# Patient Record
Sex: FEMALE | Race: BLACK OR AFRICAN AMERICAN
[De-identification: names, ages, dates, MRNs, and addresses within clinical notes are randomized per-mention and may not be internally consistent; named-entity substitution may affect disease eponyms.]

---

## 2017-06-15 ENCOUNTER — HOSPITAL ENCOUNTER (EMERGENCY)
Dept: HOSPITAL 18 - NAV ERS | Age: 52
Discharge: HOME | End: 2017-06-15
Payer: COMMERCIAL

## 2017-06-15 DIAGNOSIS — J02.9: Primary | ICD-10-CM

## 2017-06-15 PROCEDURE — 99283 EMERGENCY DEPT VISIT LOW MDM: CPT

## 2018-01-17 ENCOUNTER — HOSPITAL ENCOUNTER (EMERGENCY)
Dept: HOSPITAL 18 - NAV ERS | Age: 53
Discharge: HOME | End: 2018-01-17
Payer: SELF-PAY

## 2018-01-17 DIAGNOSIS — J06.9: Primary | ICD-10-CM

## 2018-01-17 PROCEDURE — 99283 EMERGENCY DEPT VISIT LOW MDM: CPT

## 2018-01-19 ENCOUNTER — HOSPITAL ENCOUNTER (EMERGENCY)
Dept: HOSPITAL 18 - NAV ERS | Age: 53
Discharge: HOME | End: 2018-01-19
Payer: SELF-PAY

## 2018-01-19 DIAGNOSIS — J18.9: Primary | ICD-10-CM

## 2018-01-19 LAB
ALBUMIN SERPL BCG-MCNC: 3.8 G/DL (ref 3.5–5)
ALP SERPL-CCNC: 105 U/L (ref 40–150)
ALT SERPL W P-5'-P-CCNC: 25 U/L (ref 8–55)
ANION GAP SERPL CALC-SCNC: 14 MMOL/L (ref 10–20)
AST SERPL-CCNC: 24 U/L (ref 5–34)
BILIRUB SERPL-MCNC: 0.2 MG/DL (ref 0.2–1.2)
BUN SERPL-MCNC: 15 MG/DL (ref 9.8–20.1)
CALCIUM SERPL-MCNC: 9.3 MG/DL (ref 7.8–10.44)
CHLORIDE SERPL-SCNC: 108 MMOL/L (ref 98–107)
CO2 SERPL-SCNC: 25 MMOL/L (ref 22–29)
CREAT CL PREDICTED SERPL C-G-VRATE: 0 ML/MIN (ref 70–130)
GLOBULIN SER CALC-MCNC: 3.9 G/DL (ref 2.4–3.5)
GLUCOSE SERPL-MCNC: 232 MG/DL (ref 70–105)
HGB BLD-MCNC: 11.4 G/DL (ref 12–16)
MCH RBC QN AUTO: 27.1 PG (ref 27–31)
MCV RBC AUTO: 87.1 FL (ref 81–99)
MDIFF COMPLETE?: YES
PLATELET # BLD AUTO: 218 THOU/UL (ref 130–400)
PLATELET BLD QL SMEAR: (no result)
POTASSIUM SERPL-SCNC: 3.8 MMOL/L (ref 3.5–5.1)
RBC # BLD AUTO: 4.2 MILL/UL (ref 4.2–5.4)
SODIUM SERPL-SCNC: 143 MMOL/L (ref 136–145)
WBC # BLD AUTO: 6.8 THOU/UL (ref 4.8–10.8)

## 2018-01-19 PROCEDURE — 94760 N-INVAS EAR/PLS OXIMETRY 1: CPT

## 2018-01-19 PROCEDURE — 96365 THER/PROPH/DIAG IV INF INIT: CPT

## 2018-01-19 PROCEDURE — 71046 X-RAY EXAM CHEST 2 VIEWS: CPT

## 2018-01-19 PROCEDURE — 83605 ASSAY OF LACTIC ACID: CPT

## 2018-01-19 PROCEDURE — 80053 COMPREHEN METABOLIC PANEL: CPT

## 2018-01-19 PROCEDURE — 94640 AIRWAY INHALATION TREATMENT: CPT

## 2018-01-19 PROCEDURE — 85025 COMPLETE CBC W/AUTO DIFF WBC: CPT

## 2018-01-19 PROCEDURE — 36415 COLL VENOUS BLD VENIPUNCTURE: CPT

## 2018-01-19 PROCEDURE — 87040 BLOOD CULTURE FOR BACTERIA: CPT

## 2018-01-19 PROCEDURE — 96375 TX/PRO/DX INJ NEW DRUG ADDON: CPT

## 2018-01-19 NOTE — RAD
2 VIEWS CHEST:

 

Date:  01/19/18

 

COMPARISON:  

None. 

 

HISTORY:  

Five days of cough and shortness of breath. 

 

FINDINGS:

Two views of the chest show a cardiomediastinal silhouette which is upper limits of normal in size. A
ir space opacity is seen obscuring the left hemidiaphragm and the left lower lobe consistent with lef
t lower lobe pneumonia. No right side infiltrates are seen. No obvious pleural effusion is seen. 

 

IMPRESSION: 

Left lower lobe pneumonia. 

 

 

POS: [facility]

## 2018-01-21 ENCOUNTER — HOSPITAL ENCOUNTER (EMERGENCY)
Dept: HOSPITAL 18 - NAV ERS | Age: 53
Discharge: HOME | End: 2018-01-21
Payer: SELF-PAY

## 2018-01-21 DIAGNOSIS — J18.9: Primary | ICD-10-CM

## 2018-01-21 DIAGNOSIS — I10: ICD-10-CM

## 2018-01-21 PROCEDURE — 99283 EMERGENCY DEPT VISIT LOW MDM: CPT

## 2018-02-15 ENCOUNTER — HOSPITAL ENCOUNTER (EMERGENCY)
Dept: HOSPITAL 18 - NAV ERS | Age: 53
LOS: 1 days | Discharge: ANOTHER HOSPITAL | End: 2018-02-16
Payer: SELF-PAY

## 2018-02-15 DIAGNOSIS — I10: ICD-10-CM

## 2018-02-15 DIAGNOSIS — J90: Primary | ICD-10-CM

## 2018-02-15 LAB
BASOPHILS # BLD AUTO: 0.1 THOU/UL (ref 0–0.2)
BASOPHILS NFR BLD AUTO: 1.1 % (ref 0–1)
CK MB SERPL-MCNC: 1.1 NG/ML (ref 0–6.6)
EOSINOPHIL # BLD AUTO: 0.2 THOU/UL (ref 0–0.7)
EOSINOPHIL NFR BLD AUTO: 2.7 % (ref 0–10)
HGB BLD-MCNC: 11.7 G/DL (ref 12–16)
LYMPHOCYTES # BLD AUTO: 2.2 THOU/UL (ref 1.2–3.4)
LYMPHOCYTES NFR BLD AUTO: 33.5 % (ref 21–51)
MCH RBC QN AUTO: 26.7 PG (ref 27–31)
MCV RBC AUTO: 85.1 FL (ref 81–99)
MONOCYTES # BLD AUTO: 0.5 THOU/UL (ref 0.11–0.59)
MONOCYTES NFR BLD AUTO: 8.1 % (ref 0–10)
NEUTROPHILS # BLD AUTO: 3.5 THOU/UL (ref 1.4–6.5)
NEUTROPHILS NFR BLD AUTO: 54.6 % (ref 42–75)
PLATELET # BLD AUTO: 191 THOU/UL (ref 130–400)
RBC # BLD AUTO: 4.39 MILL/UL (ref 4.2–5.4)
TROPONIN I SERPL DL<=0.01 NG/ML-MCNC: (no result) NG/ML (ref ?–0.03)
WBC # BLD AUTO: 6.4 THOU/UL (ref 4.8–10.8)

## 2018-02-15 PROCEDURE — 82553 CREATINE MB FRACTION: CPT

## 2018-02-15 PROCEDURE — 85025 COMPLETE CBC W/AUTO DIFF WBC: CPT

## 2018-02-15 PROCEDURE — 83880 ASSAY OF NATRIURETIC PEPTIDE: CPT

## 2018-02-15 PROCEDURE — 71275 CT ANGIOGRAPHY CHEST: CPT

## 2018-02-15 PROCEDURE — 36415 COLL VENOUS BLD VENIPUNCTURE: CPT

## 2018-02-15 PROCEDURE — 71045 X-RAY EXAM CHEST 1 VIEW: CPT

## 2018-02-15 PROCEDURE — 84484 ASSAY OF TROPONIN QUANT: CPT

## 2018-02-15 PROCEDURE — 93005 ELECTROCARDIOGRAM TRACING: CPT

## 2018-02-15 PROCEDURE — 94760 N-INVAS EAR/PLS OXIMETRY 1: CPT

## 2018-02-15 PROCEDURE — 83605 ASSAY OF LACTIC ACID: CPT

## 2018-02-15 PROCEDURE — 85379 FIBRIN DEGRADATION QUANT: CPT

## 2018-02-15 PROCEDURE — 80053 COMPREHEN METABOLIC PANEL: CPT

## 2018-02-16 ENCOUNTER — HOSPITAL ENCOUNTER (INPATIENT)
Dept: HOSPITAL 92 - ERS | Age: 53
LOS: 3 days | Discharge: HOME | DRG: 286 | End: 2018-02-19
Attending: INTERNAL MEDICINE | Admitting: INTERNAL MEDICINE
Payer: SELF-PAY

## 2018-02-16 VITALS — BODY MASS INDEX: 33 KG/M2

## 2018-02-16 DIAGNOSIS — I11.0: Primary | ICD-10-CM

## 2018-02-16 DIAGNOSIS — I34.0: ICD-10-CM

## 2018-02-16 DIAGNOSIS — I50.21: ICD-10-CM

## 2018-02-16 DIAGNOSIS — I42.8: ICD-10-CM

## 2018-02-16 DIAGNOSIS — Z79.82: ICD-10-CM

## 2018-02-16 DIAGNOSIS — Z23: ICD-10-CM

## 2018-02-16 DIAGNOSIS — E87.6: ICD-10-CM

## 2018-02-16 DIAGNOSIS — E11.9: ICD-10-CM

## 2018-02-16 DIAGNOSIS — J96.01: ICD-10-CM

## 2018-02-16 LAB
ALBUMIN SERPL BCG-MCNC: 3.7 G/DL (ref 3.5–5)
ALP SERPL-CCNC: 82 U/L (ref 40–150)
ALT SERPL W P-5'-P-CCNC: 15 U/L (ref 8–55)
ANION GAP SERPL CALC-SCNC: 10 MMOL/L (ref 10–20)
ANION GAP SERPL CALC-SCNC: 11 MMOL/L (ref 10–20)
ANION GAP SERPL CALC-SCNC: 15 MMOL/L (ref 10–20)
AST SERPL-CCNC: 15 U/L (ref 5–34)
BASOPHILS # BLD AUTO: 0 THOU/UL (ref 0–0.2)
BASOPHILS NFR BLD AUTO: 0.6 % (ref 0–1)
BILIRUB SERPL-MCNC: 0.3 MG/DL (ref 0.2–1.2)
BUN SERPL-MCNC: 10 MG/DL (ref 9.8–20.1)
BUN SERPL-MCNC: 13 MG/DL (ref 9.8–20.1)
BUN SERPL-MCNC: 9 MG/DL (ref 9.8–20.1)
CALCIUM SERPL-MCNC: 8.9 MG/DL (ref 7.8–10.44)
CHD RISK SERPL-RTO: 3.7 (ref ?–4.5)
CHLORIDE SERPL-SCNC: 105 MMOL/L (ref 98–107)
CHLORIDE SERPL-SCNC: 106 MMOL/L (ref 98–107)
CHLORIDE SERPL-SCNC: 106 MMOL/L (ref 98–107)
CHOLEST SERPL-MCNC: 153 MG/DL
CK MB SERPL-MCNC: 1.2 NG/ML (ref 0–6.6)
CK MB SERPL-MCNC: 1.3 NG/ML (ref 0–6.6)
CO2 SERPL-SCNC: 25 MMOL/L (ref 22–29)
CO2 SERPL-SCNC: 27 MMOL/L (ref 22–29)
CO2 SERPL-SCNC: 30 MMOL/L (ref 22–29)
CREAT CL PREDICTED SERPL C-G-VRATE: 0 ML/MIN (ref 70–130)
CREAT CL PREDICTED SERPL C-G-VRATE: 108 ML/MIN (ref 70–130)
CREAT CL PREDICTED SERPL C-G-VRATE: 111 ML/MIN (ref 70–130)
EOSINOPHIL # BLD AUTO: 0.1 THOU/UL (ref 0–0.7)
EOSINOPHIL NFR BLD AUTO: 2.3 % (ref 0–10)
GLOBULIN SER CALC-MCNC: 3.6 G/DL (ref 2.4–3.5)
GLUCOSE SERPL-MCNC: 196 MG/DL (ref 70–105)
GLUCOSE SERPL-MCNC: 203 MG/DL (ref 70–105)
GLUCOSE SERPL-MCNC: 208 MG/DL (ref 70–105)
HDLC SERPL-MCNC: 41 MG/DL
HGB BLD-MCNC: 11.3 G/DL (ref 12–16)
INR PPP: 1.2
LDLC SERPL CALC-MCNC: 94 MG/DL
LYMPHOCYTES # BLD: 1.8 THOU/UL (ref 1.2–3.4)
LYMPHOCYTES NFR BLD AUTO: 29.4 % (ref 21–51)
MAGNESIUM SERPL-MCNC: 1.7 MG/DL (ref 1.6–2.6)
MCH RBC QN AUTO: 29 PG (ref 27–31)
MCV RBC AUTO: 90.4 FL (ref 81–99)
MONOCYTES # BLD AUTO: 0.5 THOU/UL (ref 0.11–0.59)
MONOCYTES NFR BLD AUTO: 7.9 % (ref 0–10)
NEUTROPHILS # BLD AUTO: 3.7 THOU/UL (ref 1.4–6.5)
NEUTROPHILS NFR BLD AUTO: 59.8 % (ref 42–75)
PLATELET # BLD AUTO: 203 THOU/UL (ref 130–400)
POTASSIUM SERPL-SCNC: 2.9 MMOL/L (ref 3.5–5.1)
POTASSIUM SERPL-SCNC: 3.1 MMOL/L (ref 3.5–5.1)
POTASSIUM SERPL-SCNC: 3.2 MMOL/L (ref 3.5–5.1)
PROTHROMBIN TIME: 14.9 SEC (ref 12–14.7)
RBC # BLD AUTO: 3.88 MILL/UL (ref 4.2–5.4)
SODIUM SERPL-SCNC: 141 MMOL/L (ref 136–145)
SODIUM SERPL-SCNC: 142 MMOL/L (ref 136–145)
SODIUM SERPL-SCNC: 143 MMOL/L (ref 136–145)
TRIGL SERPL-MCNC: 90 MG/DL (ref ?–150)
TROPONIN I SERPL DL<=0.01 NG/ML-MCNC: 0.01 NG/ML (ref ?–0.03)
TROPONIN I SERPL DL<=0.01 NG/ML-MCNC: 0.01 NG/ML (ref ?–0.03)
TROPONIN I SERPL DL<=0.01 NG/ML-MCNC: 0.02 NG/ML (ref ?–0.03)
WBC # BLD AUTO: 6.2 THOU/UL (ref 4.8–10.8)

## 2018-02-16 PROCEDURE — 80048 BASIC METABOLIC PNL TOTAL CA: CPT

## 2018-02-16 PROCEDURE — 93458 L HRT ARTERY/VENTRICLE ANGIO: CPT

## 2018-02-16 PROCEDURE — 36415 COLL VENOUS BLD VENIPUNCTURE: CPT

## 2018-02-16 PROCEDURE — 82553 CREATINE MB FRACTION: CPT

## 2018-02-16 PROCEDURE — 36416 COLLJ CAPILLARY BLOOD SPEC: CPT

## 2018-02-16 PROCEDURE — 85610 PROTHROMBIN TIME: CPT

## 2018-02-16 PROCEDURE — 3E0234Z INTRODUCTION OF SERUM, TOXOID AND VACCINE INTO MUSCLE, PERCUTANEOUS APPROACH: ICD-10-PCS | Performed by: INTERNAL MEDICINE

## 2018-02-16 PROCEDURE — 90471 IMMUNIZATION ADMIN: CPT

## 2018-02-16 PROCEDURE — 84484 ASSAY OF TROPONIN QUANT: CPT

## 2018-02-16 PROCEDURE — 90682 RIV4 VACC RECOMBINANT DNA IM: CPT

## 2018-02-16 PROCEDURE — B2111ZZ FLUOROSCOPY OF MULTIPLE CORONARY ARTERIES USING LOW OSMOLAR CONTRAST: ICD-10-PCS | Performed by: INTERNAL MEDICINE

## 2018-02-16 PROCEDURE — G0008 ADMIN INFLUENZA VIRUS VAC: HCPCS

## 2018-02-16 PROCEDURE — 83036 HEMOGLOBIN GLYCOSYLATED A1C: CPT

## 2018-02-16 PROCEDURE — A4216 STERILE WATER/SALINE, 10 ML: HCPCS

## 2018-02-16 PROCEDURE — 93798 PHYS/QHP OP CAR RHAB W/ECG: CPT

## 2018-02-16 PROCEDURE — 93005 ELECTROCARDIOGRAM TRACING: CPT

## 2018-02-16 PROCEDURE — 80061 LIPID PANEL: CPT

## 2018-02-16 PROCEDURE — 71046 X-RAY EXAM CHEST 2 VIEWS: CPT

## 2018-02-16 PROCEDURE — 96374 THER/PROPH/DIAG INJ IV PUSH: CPT

## 2018-02-16 PROCEDURE — 93306 TTE W/DOPPLER COMPLETE: CPT

## 2018-02-16 PROCEDURE — 4A023N7 MEASUREMENT OF CARDIAC SAMPLING AND PRESSURE, LEFT HEART, PERCUTANEOUS APPROACH: ICD-10-PCS | Performed by: INTERNAL MEDICINE

## 2018-02-16 PROCEDURE — 85025 COMPLETE CBC W/AUTO DIFF WBC: CPT

## 2018-02-16 PROCEDURE — C1769 GUIDE WIRE: HCPCS

## 2018-02-16 PROCEDURE — B2151ZZ FLUOROSCOPY OF LEFT HEART USING LOW OSMOLAR CONTRAST: ICD-10-PCS | Performed by: INTERNAL MEDICINE

## 2018-02-16 PROCEDURE — 83735 ASSAY OF MAGNESIUM: CPT

## 2018-02-16 RX ADMIN — NITROGLYCERIN SCH: 20 OINTMENT TOPICAL at 22:30

## 2018-02-16 RX ADMIN — NITROGLYCERIN SCH INCH: 20 OINTMENT TOPICAL at 06:59

## 2018-02-16 RX ADMIN — NITROGLYCERIN SCH INCH: 20 OINTMENT TOPICAL at 14:58

## 2018-02-16 NOTE — CT
PRELIMINARY REPORT/[facility]/EMERGENCY AFTER-HOURS PROCEDURE:

 

EXAM:

CT Angiography Chest With Intravenous Contrast

 

CLINICAL HISTORY:

52 years old, female; Pain; Chest pain; On breathing; Patient HX: SOB and mid chest pain

 

TECHNIQUE:

Axial computed tomographic angiography images of the chest with intravenous contrast using pulmonary 
embolism protocol. All CT scans at this facility use one or more dose reduction techniques, viz.: aut
omated exposure control; ma/kV adjustment per patient size (including targeted exams where dose is ma
tched to indication; i.e. head); or iterative reconstruction technique.

MIP reconstructed images were created and reviewed.

 

CONTRAST:

97 mL of ISOVUE 370 administered intravenously.

 

COMPARISON:

No relevant prior studies available.

 

FINDINGS:

Pulmonary arteries: Unremarkable. No pulmonary embolism.

Aorta: No acute findings. No thoracic aortic aneurysm.

Lungs: Scattered pulmonary parenchymal cysts. Left lower lobe compressive atelectasis.

Subsegmental atelectasis in the bilateral lungs. No mass.

Pleural space: Moderate bilateral pleural effusions. No pneumothorax.

Heart: Mild cardiomegaly. No significant pericardial effusion. No evidence of RV dysfunction.

Bones/joints: Degenerative changes in the spine. No acute fracture. No dislocation.

Soft tissues: Unremarkable.

Lymph nodes: Unremarkable. No enlarged lymph nodes.

 

IMPRESSION:

1. No pulmonary embolism is evident.

2. Moderate bilateral pleural effusions with left lower lobe compressive atelectasis.

 

Thank you for allowing us to participate in the care of your patient.

 

Dictated and Authenticated by: Sravanthi Roy MD

02/16/2018 1:26 AM Central Time ([place] & [place])

 

 

 

FINAL REPORT 

CT PULMONARY ANGIOGRAM WITH IV CONTRAST AND 3D POSTPROCESSING:

 

Date:  02/16/18

 

FINDINGS/IMPRESSION: 

I agree with the preliminary report given by Dr. Sravanthi Roy of [facility]. 

 

 

POS: [facility]

## 2018-02-16 NOTE — CON
DATE OF CONSULTATION:  02/16/2018

 

HISTORY:  The patient is a pleasant 52-year-old woman who presented with increasing dyspnea.  The pat
cory several weeks ago presented with shortness of breath.  She went to a local emergency room.  She 
was thought to have pneumonia and treated with antibiotics.  The patient developed progressive dyspne
a and eventually presented to the emergency room.  The patient has multiple cardiac risk factors incl
uding hypertension and diabetes mellitus.

 

PAST MEDICAL HISTORY: 

1.  Hypertension.

2.  Diabetes mellitus.

 

PAST SURGICAL HISTORY:  None.

 

SOCIAL HISTORY:  None. 

 

FAMILY HISTORY:  Positive family history of heart disease.

 

ALLERGIES:  No known drug allergies.

 

MEDICATIONS ON ADMISSION:  None.

 

REVIEW OF SYSTEMS:  Unremarkable.

 

PHYSICAL EXAMINATION:

GENERAL:  Ill-appearing woman.  

VITAL SIGNS:  Blood pressure 149/98. 

NECK:  Neck is full with no jugular venous distention.

LUNGS:  Crackles throughout both lung bases.

HEART:  Regular rate and rhythm, normal S1, S2 with a 1/6 systolic murmur.

ABDOMEN:  Nondistended.

EXTREMITIES:  Showed no edema.

SKIN:  Warm and dry.

NEUROLOGIC:  Nonfocal.

VASCULAR:  Radial pulses 2+.

 

LABORATORY DATA:  Sodium 141, potassium 2.9, chloride 106, bicarbonate 27, BUN 10.  Creatinine 0.76, 
glucose 203, troponin 0.011.  White blood cell count 6.2, hemoglobin 11.3, hematocrit 35.1, troponin 
was 0.011.  

 

Her EKG revealed her to have sinus rhythm, left atrial enlargement, nonspecific T-wave abnormality.

 

IMPRESSION:

1.  Congestive heart failure.

2.  Cardiomyopathy.

3.  Severe mitral regurgitation.

4.  Hypertension.

5.  Diabetes mellitus.

 

This unfortunate woman has developed severe congestive heart failure.  The echocardiogram revealed he
r to have marked decrease in left systolic function with severe MR.  From a cardiac standpoint, I alexis abbasi recommend treatment with beta blocker therapy, ACE inhibitor therapy, diuretics and spironolactone
.  I have recommended to the patient she have a cardiac catheterization to evaluate whether she has s
ignificant coronary artery disease.

 

PLAN:

1.  Start Coreg 6.25 mg p.o. b.i.d.

2.  Start lisinopril 2.5 b.i.d.

3.  Add spironolactone 25 daily.

4.  Aspirin tablet daily.

5.  Proceed with cardiac catheterization.

## 2018-02-16 NOTE — RAD
PORTABLE AP CHEST:

 

Date:  02/15/18 

 

HISTORY:  

Five days of coughing, worsening shortness of breath. 

 

COMPARISON:  

01/19/18. 

 

FINDINGS:

Again noted are pleural and parenchymal changes in the left lung base. This may be related to moderat
e size left pleural effusion or atelectasis, although infiltrate related to pneumonia is a possibilit
y. There are mild increased interstitial densities in the perihilar regions bilaterally, also seen on
 prior study. Cardiac silhouette is magnified by projection. Pulmonary vasculature is within normal l
imits. 

 

IMPRESSION: 

Pleural and parenchymal lung changes again seen at the left lung base which may be related to moderat
e size left pleural effusion and atelectasis. However, infiltrate related to a pneumonia is a possibi
lity. There are mild increasing perihilar interstitial densities also present on the prior exam. 

 

 

POS: [facility]

## 2018-02-16 NOTE — HP
DATE OF ADMISSION:  2018

 

TIME OF SERVICE:  0415 hours.

 

PRIMARY CARE PHYSICIAN:  None.  This is technically a city call.  She does go to the [facility] on occasion for care.

 

CHIEF COMPLAINT:  Shortness of breath.

 

HISTORY OF PRESENT ILLNESS:  Ms. Escoto is a 52-year-old -American female with no known past m
edical history.

 

She has a history one month ago of having a pneumonia-like illness.  She went to the [place] ER for 
evaluation and was put on azithromycin and Tessalon.  Seemed to get over that, but had chronic cough 
for about a month.  Says about 2 days ago, she started having increasing shortness of breath, dyspnea
 on exertion, mild orthopnea, and PND.  She denies any lower extremity edema.  No chest pain, no palp
itations.  No nausea, vomiting or diarrhea or constipation.

 

On arrival to the ER there, she was found to have a BNP of 1100, CT scan showed moderate-sized bilate
ral pleural effusions.  Remainder of her labs remained normal.  She was transferred here for further 
workup.

 

On arrival here, she remains low 90s on 2 liters nasal cannula.  When they took her off oxygen, she w
as 89% on room air here.  At the outside emergency department, she got 20 mEq of potassium only and n
o Lasix.  Here she did not get anything either.  We were subsequently called for admission.

 

PAST MEDICAL HISTORY:  None.

 

PAST SURGICAL HISTORY:  None.

 

HOME MEDICATIONS:  None regularly.  She completed azithromycin 250 mg daily for 4 days after initial 
loading dose and she has Tessalon 100 mg p.o. t.i.d. p.r.n. for cough.

 

ALLERGIES:  NKDA.

 

FAMILY HISTORY:  Significant for mom who  of an MI at age 83.  Her dad had coronary artery diseas
e, starting in his later years.

 

SOCIAL HISTORY:  Negative for habits x3.  She is , monogamous.

 

REVIEW OF SYSTEMS:  A 10-point review of systems was performed and was negative for all other systems
 except as noted per HPI.

 

PHYSICAL EXAMINATION:

VITAL SIGNS:  Temperature 99.1, pulse 97, blood pressure 151/115, respiratory 21, sats 95% on 2 liter
s.

GENERAL:  She is awake.  She is alert.  She is oriented x3.  She is a well-developed, well-nourished 
-American female.  She appears to be in no acute distress.

HEENT:  Normocephalic, atraumatic.  Pupils equal, reactive bilaterally.  Mucous membrane moist.  She 
has no visible lesions.  No thrush.

NECK:  Supple.  She has no lymphadenopathy, no JVD, no thyromegaly.  She has normal carotid upstroke.
  There are no bruits.

LUNGS:  Clear anteriorly.  Posteriorly, she has crackles about MCC up.  She has dullness to bilat
eral bases.

CARDIOVASCULAR:  She has normal cardiac and regular.  Normal S1 and S2.  She has an S4, but no S3.  I
 do not appreciate any murmurs.

ABDOMEN:  Soft.  It is obese and is nontender, nondistended.  She has no masses, no organomegaly, no 
shifting dullness.  She has good bowel sounds in all four quadrants.  There is no rebound, rigidity, 
or guarding.

EXTREMITIES:  No signs of clubbing, no edema.  She has 2+ peripheral pulse in dorsalis pedis and post
erior tibial and radial arteries bilaterally.

SKIN:  Warm, moist, and well perfused.  She has no rashes or lesions.

NEUROLOGIC:  Cranial nerves II through XII are grossly intact.  She has no focal deficits.  5/5 stren
gth in all 4 extremities.  She has normal speech pattern.

MUSCULOSKELETAL:  Normal to inspection.  Large joints are uninflamed.  There is no palpable effusion.


 

LABORATORY AND DIAGNOSTIC DATA:  At the outside facility, sodium 143, potassium 3.2, chloride 106, bi
carbonate 25, BUN 13, creatinine 0.84 and glucose 208 with calcium of 8.9.  Liver function completed 
within normal limits.  CBC showed a white count of 6.4, hemoglobin is 11.7, hematocrit is 37.4 and pl
atelet count is 191,000.  She has normal differential.  Lactic acid 2.0.  BNP is 1112.2, CK-MB normal
 at 1.1, troponin I less than 0.010.  D-dimer was mildly elevated at 0.92.  CT angiogram negative for
 pulmonary embolus.  She had moderate sized bilateral pleural effusion.  Chest x-ray showed blunting 
of the costophrenic angles.  No parenchymal opacities noted.

 

ASSESSMENT AND PLAN:

1.  New onset congestive heart failure, type unknown.  I do wonder if she had some kind of viral infe
ction a month ago and developed viral myocarditis.  At this time, her troponins are normal.  We will 
get Lasix on board q.12 hours, strict I's and O's, placed nitro paste to a chest wall and get serial 
cardiac biomarkers.  We will get a 2D echocardiogram to assess cardiac function.  May need to get Car
diology involved.

2.  Moderate-sized pleural effusions:  May need to be tapped.  Would defer to the day team whether th
ey want to get Pulmonary involved for that or not.  In the meantime, we will continue diuresis.

3.  Acute hypoxic respiratory failure 86% on room air with the outside facility, 89% on room air here
.  She is on 2 liters nasal cannula, satting well.  We will wean as tolerated.

4.  Hypertension.  Blood pressure on arrival here 151/115, now in the 170s/120s.  She was placed on n
itro paste and metoprolol.  She is likely to be on ACE inhibitor.  She does have heart failure.

## 2018-02-16 NOTE — PDOC.PN
- Subjective


Encounter Start Date: 02/16/18


Encounter Start Time: 08:26


Subjective: seen and examined feeling ok





- Objective


Resuscitation Status: 


 











Resuscitation Status           FULL:Full Resuscitation














Vital Signs & Weight: 


 Vital Signs (12 hours)











  Temp Pulse Resp BP Pulse Ox


 


 02/16/18 06:02  96.9 F L  100  20  149/75 H  94 L








 Weight











Weight                         174 lb 12.8 oz














I&O: 


 











 02/15/18 02/16/18 02/17/18





 06:59 06:59 06:59


 


Output Total  500 


 


Balance  -500 











Result Diagrams: 


 02/16/18 04:28





 02/16/18 04:28





Phys Exam





- Physical Examination


Constitutional: NAD


HEENT: PERRLA, moist MMs, sclera anicteric, TM's clear, oral pharynx no lesions


Neck: no nodes


Respiratory: no wheezing, no rales, no rhonchi


Cardiovascular: RRR, no significant murmur, no rub


Gastrointestinal: soft, non-tender, positive bowel sounds


Musculoskeletal: pulses present, edema present





Dx/Plan


(1) CHF (congestive heart failure)


Code(s): I50.9 - HEART FAILURE, UNSPECIFIED   Status: Acute   





(2) Hypertension


Code(s): I10 - ESSENTIAL (PRIMARY) HYPERTENSION   Status: Acute   





(3) Hypokalemia


Code(s): E87.6 - HYPOKALEMIA   Status: Acute   





- Plan


cont current plan of care, PT/OT, , respiratory therapy


diuresis





* .

## 2018-02-17 LAB
ANION GAP SERPL CALC-SCNC: 10 MMOL/L (ref 10–20)
BASOPHILS # BLD AUTO: 0 THOU/UL (ref 0–0.2)
BASOPHILS NFR BLD AUTO: 0.5 % (ref 0–1)
BUN SERPL-MCNC: 9 MG/DL (ref 9.8–20.1)
CALCIUM SERPL-MCNC: 8.9 MG/DL (ref 7.8–10.44)
CHLORIDE SERPL-SCNC: 111 MMOL/L (ref 98–107)
CO2 SERPL-SCNC: 25 MMOL/L (ref 22–29)
CREAT CL PREDICTED SERPL C-G-VRATE: 116 ML/MIN (ref 70–130)
EOSINOPHIL # BLD AUTO: 0.1 THOU/UL (ref 0–0.7)
EOSINOPHIL NFR BLD AUTO: 1.7 % (ref 0–10)
GLUCOSE SERPL-MCNC: 155 MG/DL (ref 70–105)
HGB BLD-MCNC: 11 G/DL (ref 12–16)
LYMPHOCYTES # BLD: 1 THOU/UL (ref 1.2–3.4)
LYMPHOCYTES NFR BLD AUTO: 18.1 % (ref 21–51)
MAGNESIUM SERPL-MCNC: 1.6 MG/DL (ref 1.6–2.6)
MCH RBC QN AUTO: 28.9 PG (ref 27–31)
MCV RBC AUTO: 89.5 FL (ref 81–99)
MONOCYTES # BLD AUTO: 0.4 THOU/UL (ref 0.11–0.59)
MONOCYTES NFR BLD AUTO: 7.5 % (ref 0–10)
NEUTROPHILS # BLD AUTO: 4.1 THOU/UL (ref 1.4–6.5)
NEUTROPHILS NFR BLD AUTO: 72.1 % (ref 42–75)
PLATELET # BLD AUTO: 190 THOU/UL (ref 130–400)
POTASSIUM SERPL-SCNC: 3.7 MMOL/L (ref 3.5–5.1)
RBC # BLD AUTO: 3.81 MILL/UL (ref 4.2–5.4)
SODIUM SERPL-SCNC: 142 MMOL/L (ref 136–145)
WBC # BLD AUTO: 5.6 THOU/UL (ref 4.8–10.8)

## 2018-02-17 RX ADMIN — NITROGLYCERIN SCH: 20 OINTMENT TOPICAL at 05:39

## 2018-02-17 NOTE — PDOC.PN
- Subjective


Encounter Start Date: 02/17/18


Encounter Start Time: 07:48


Subjective: Seen and examined feeling better





- Objective


Resuscitation Status: 


 











Resuscitation Status           FULL:Full Resuscitation














Vital Signs & Weight: 


 Vital Signs (12 hours)











  Temp Pulse Resp BP BP Pulse Ox


 


 02/17/18 03:19  98.1 F  98  17   121/59 L  94 L


 


 02/17/18 00:00   101 H    153/76 H 


 


 02/16/18 20:00  97.4 F L  99  18  148/75 H  148/75 H  95








 Weight











Weight                         174 lb 12.8 oz














I&O: 


 











 02/16/18 02/17/18 02/18/18





 06:59 06:59 06:59


 


Intake Total  828 


 


Output Total 500 3700 


 


Balance -500 -4138 











Result Diagrams: 


 02/17/18 04:05





 02/17/18 04:05


Additional Labs: 


 Accuchecks











  02/17/18 02/16/18





  05:38 20:27


 


POC Glucose  135 H  152 H














Phys Exam





- Physical Examination


Constitutional: NAD


HEENT: PERRLA, moist MMs, sclera anicteric, TM's clear


Neck: no nodes, no JVD, supple, full ROM


Respiratory: no wheezing, no rales, no rhonchi, clear to auscultation bilateral


Cardiovascular: RRR, no significant murmur, no rub


Gastrointestinal: soft, non-tender, no distention, positive bowel sounds


Musculoskeletal: no edema, pulses present





Dx/Plan


(1) CHF (congestive heart failure)


Code(s): I50.9 - HEART FAILURE, UNSPECIFIED   Status: Acute   





(2) Hypertension


Code(s): I10 - ESSENTIAL (PRIMARY) HYPERTENSION   Status: Acute   





(3) Hypokalemia


Code(s): E87.6 - HYPOKALEMIA   Status: Acute   





- Plan


PT/OT, , respiratory therapy


Diuresis


-: Appreciate cardiology input


-: Continue lisisnopril,coreg and aldactone


-:  cardiac cath--unremarkable





* .

## 2018-02-18 NOTE — PDOC.PN
- Subjective


Encounter Start Date: 02/18/18


Encounter Start Time: 07:53


Subjective: seen and examined feeling better





- Objective


Resuscitation Status: 


 











Resuscitation Status           FULL:Full Resuscitation














Vital Signs & Weight: 


 Vital Signs (12 hours)











  Temp Pulse Resp BP BP Pulse Ox


 


 02/18/18 07:51  98.0 F  98  18   131/81  94 L


 


 02/18/18 04:00  98.1 F  95  18   121/65  92 L


 


 02/17/18 20:32   91   132/55 L  








 Weight











Weight                         170 lb 6.4 oz














I&O: 


 











 02/17/18 02/18/18 02/19/18





 06:59 06:59 06:59


 


Intake Total 828 785 


 


Output Total 3700 2000 


 


Balance -4564 -6955 











Result Diagrams: 


 02/17/18 04:05





 02/17/18 04:05


Additional Labs: 


 Accuchecks











  02/18/18 02/17/18 02/17/18





  05:53 20:06 16:53


 


POC Glucose  185 H  237 H  155 H














  02/17/18





  10:47


 


POC Glucose  185 H














Phys Exam





- Physical Examination


Constitutional: NAD


HEENT: PERRLA, moist MMs, sclera anicteric, TM's clear, oral pharynx no lesions


Neck: no nodes, no JVD, supple, full ROM


Respiratory: no wheezing, no rales, no rhonchi, clear to auscultation bilateral


Cardiovascular: RRR, no significant murmur, no rub


Gastrointestinal: soft, non-tender, no distention, positive bowel sounds


Musculoskeletal: no edema, pulses present





Dx/Plan


(1) CHF (congestive heart failure)


Code(s): I50.9 - HEART FAILURE, UNSPECIFIED   Status: Acute   





(2) Hypertension


Code(s): I10 - ESSENTIAL (PRIMARY) HYPERTENSION   Status: Acute   





(3) Hypokalemia


Code(s): E87.6 - HYPOKALEMIA   Status: Acute   





- Plan


plan discussed w/ family, PT/OT, , respiratory therapy


Diuresis--d/c iv lasix and start po lasix


-: If doing well on po lasix d/c later today or next 24hrs if ok with cardiolo


-: Repeat CXR to re-evaluate the mod large pleural effusion on admission





* .

## 2018-02-18 NOTE — RAD
TWO VIEWS OF THE CHEST:

 

COMPARISON: 

1/19/18.

 

HISTORY: 

Pleural effusion.

 

FINDINGS: 

Two views of the chest show a normal-size cardiomediastinal silhouette.  An infiltrate is again seen 
in the left lower lobe.  No right side infiltrate is seen.  No significant pleural effusion is presen
t.

 

IMPRESSION: 

Left lower lobe pneumonia.

 

POS: [facility]

## 2018-02-19 VITALS — DIASTOLIC BLOOD PRESSURE: 79 MMHG | SYSTOLIC BLOOD PRESSURE: 131 MMHG

## 2018-02-19 VITALS — TEMPERATURE: 98.3 F

## 2018-02-19 LAB
ANION GAP SERPL CALC-SCNC: 15 MMOL/L (ref 10–20)
BUN SERPL-MCNC: 16 MG/DL (ref 9.8–20.1)
CALCIUM SERPL-MCNC: 9 MG/DL (ref 7.8–10.44)
CHLORIDE SERPL-SCNC: 108 MMOL/L (ref 98–107)
CO2 SERPL-SCNC: 22 MMOL/L (ref 22–29)
CREAT CL PREDICTED SERPL C-G-VRATE: 100 ML/MIN (ref 70–130)
GLUCOSE SERPL-MCNC: 198 MG/DL (ref 70–105)
POTASSIUM SERPL-SCNC: 5 MMOL/L (ref 3.5–5.1)
SODIUM SERPL-SCNC: 140 MMOL/L (ref 136–145)

## 2018-02-19 NOTE — PDOC.PN
- Subjective


Encounter Start Date: 02/19/18


Encounter Start Time: 09:02


Subjective: no sob or pain





- Objective


Resuscitation Status: 


 











Resuscitation Status           FULL:Full Resuscitation














MAR Reviewed: Yes


Vital Signs & Weight: 


 Vital Signs (12 hours)











  Temp Pulse Resp BP Pulse Ox


 


 02/19/18 04:00  98.4 F  91  18  127/77  94 L








 Weight











Weight                         161 lb 14.4 oz














I&O: 


 











 02/18/18 02/19/18 02/20/18





 06:59 06:59 06:59


 


Intake Total 785 1600 


 


Output Total 2000 1400 


 


Balance -1215 200 











Result Diagrams: 


 02/17/18 04:05





 02/17/18 04:05


Additional Labs: 


 Accuchecks











  02/18/18





  11:20


 


POC Glucose  178 H














Phys Exam





- Physical Examination


Constitutional: NAD


Neck: no JVD


Respiratory: clear to auscultation bilateral


Cardiovascular: RRR


3/6 sys murmur


Gastrointestinal: soft, positive bowel sounds


Musculoskeletal: no edema





Dx/Plan


(1) Acute respiratory failure


Code(s): J96.00 - ACUTE RESPIRATORY FAILURE, UNSP W HYPOXIA OR HYPERCAPNIA   

Status: Acute   


Qualifiers: 


   Respiratory failure complication: hypoxia   Qualified Code(s): J96.01 - 

Acute respiratory failure with hypoxia   





(2) Acute systolic (congestive) heart failure


Code(s): I50.21 - ACUTE SYSTOLIC (CONGESTIVE) HEART FAILURE   Status: Acute   





(3) Cardiomyopathy, nonischemic


Code(s): I42.8 - OTHER CARDIOMYOPATHIES   Status: Acute   





(4) HTN (hypertension)


Code(s): I10 - ESSENTIAL (PRIMARY) HYPERTENSION   Status: Acute   


Qualifiers: 


   Hypertension type: essential hypertension   Qualified Code(s): I10 - 

Essential (primary) hypertension   





- Plan


cont current plan of care


DC sukhdev with life vest





* .

## 2018-02-19 NOTE — DIS
DATE OF ADMISSION:  02/16/2018

 

DATE OF DISCHARGE:  02/19/2018

 

PRIMARY CARE PROVIDER:  No PCP.

 

FINAL DIAGNOSES:  Acute respiratory failure, hypoxemic, acute systolic heart failure, hypertension, m
itral insufficiency, cardiomyopathy post-therapy for pneumonia.

 

DISCHARGE MEDICATIONS:  Lipitor 40 mg a day, Coreg 12.5 mg twice a day, Lasix 20 mg a day, Lisinopril
 10 mg twice a day, Aldactone 25 mg a day.

 

ALLERGIES:  No known drug allergies.

 

PENDING AT THE TIME OF DISCHARGE:  Nothing.

 

CODE STATUS:  FULL.

 

HOSPITAL COURSE:  Patient admitted with shortness of breath.  She had been recently treated for pneum
onia.  Her chest x-ray showed an incompletely-resolved pneumonia.  Her laboratories were pertinent fo
r white count 6.2 with no left shift, hemoglobin 11.3, platelet count of 203,000.  INR 1.2.  Initial 
potassium 2.9, sodium 141, creatinine 0.76, BUN 10.  Blood sugar was in the 200 range.  Troponins wer
e normal on 08/16/2018.  Dr. David Wong was consulted.  Echocardiogram revealed a severe decreased
 LVEF.  No procedures were done.  The patient was treated with diuresis, beta blockers, ACE inhibitor
s.  Her most recent sodium is 140, potassium 5.0, creatinine 0.76, BUN 16.  The patient is remarkably
 improved.  Because of her decreased cardiac function, a LifeVest has been fitted.  She is being disc
harged home on the appropriate medicines.  Medicines have been written.  She has been instructed to f
ind a PCP and be seen within 1 week as possible.  We will arrange followup with Dr. Wong.

## 2018-03-17 NOTE — EKG
Test Reason : 

Blood Pressure : ***/*** mmHG

Vent. Rate : 098 BPM     Atrial Rate : 098 BPM

   P-R Int : 146 ms          QRS Dur : 098 ms

    QT Int : 390 ms       P-R-T Axes : 044 -01 061 degrees

   QTc Int : 497 ms

 

Normal sinus rhythm

Possible Left atrial enlargement

Nonspecific T wave abnormality

Prolonged QT

Abnormal ECG

 

Confirmed by BELLA EDOUARD, VICTOR M MCKAY ([id number]),  JAYCEE BARTON ([id number]) on 3/17/2018 11:43:59 AM

 

Referred By:             Confirmed By:VICTOR M BLANCO MD

## 2018-04-27 ENCOUNTER — HOSPITAL ENCOUNTER (EMERGENCY)
Dept: HOSPITAL 18 - NAV ERS | Age: 53
Discharge: HOME | End: 2018-04-27
Payer: SELF-PAY

## 2018-04-27 DIAGNOSIS — E78.5: ICD-10-CM

## 2018-04-27 DIAGNOSIS — I50.9: ICD-10-CM

## 2018-04-27 DIAGNOSIS — E11.65: Primary | ICD-10-CM

## 2018-04-27 DIAGNOSIS — I11.0: ICD-10-CM

## 2018-04-27 PROCEDURE — 36416 COLLJ CAPILLARY BLOOD SPEC: CPT

## 2018-04-27 PROCEDURE — 93005 ELECTROCARDIOGRAM TRACING: CPT

## 2018-05-13 ENCOUNTER — HOSPITAL ENCOUNTER (EMERGENCY)
Dept: HOSPITAL 18 - NAV ERS | Age: 53
Discharge: HOME | End: 2018-05-13
Payer: SELF-PAY

## 2018-05-13 DIAGNOSIS — R11.2: Primary | ICD-10-CM

## 2018-05-13 DIAGNOSIS — Z79.899: ICD-10-CM

## 2018-05-13 DIAGNOSIS — I50.9: ICD-10-CM

## 2018-05-13 DIAGNOSIS — E11.9: ICD-10-CM

## 2018-05-13 DIAGNOSIS — E78.5: ICD-10-CM

## 2018-05-13 DIAGNOSIS — Z79.84: ICD-10-CM

## 2018-05-13 DIAGNOSIS — I11.0: ICD-10-CM

## 2018-05-13 LAB
ACTUAL BICARBONATE (HCO3V): 23 MEQ/L (ref 24–30)
BASE EXCESS BLDA CALC-SCNC: -2.7 MEQ/L
HGB BLDV-MCNC: 13.2 G/DL (ref 11.7–16)

## 2018-05-13 PROCEDURE — 93005 ELECTROCARDIOGRAM TRACING: CPT

## 2018-05-13 PROCEDURE — 82805 BLOOD GASES W/O2 SATURATION: CPT

## 2018-05-13 PROCEDURE — 36416 COLLJ CAPILLARY BLOOD SPEC: CPT

## 2018-05-29 ENCOUNTER — HOSPITAL ENCOUNTER (EMERGENCY)
Dept: HOSPITAL 18 - NAV ERS | Age: 53
Discharge: HOME | End: 2018-05-29
Payer: SELF-PAY

## 2018-05-29 DIAGNOSIS — I50.9: ICD-10-CM

## 2018-05-29 DIAGNOSIS — R42: ICD-10-CM
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
## 2018-05-29 DIAGNOSIS — T46.5X5A: ICD-10-CM

## 2018-05-29 DIAGNOSIS — N28.9: ICD-10-CM

## 2018-05-29 DIAGNOSIS — E11.9: ICD-10-CM

## 2018-05-29 DIAGNOSIS — E78.5: ICD-10-CM
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
## 2018-05-29 DIAGNOSIS — Z79.899: ICD-10-CM

## 2018-05-29 DIAGNOSIS — Z79.84: ICD-10-CM

## 2018-05-29 DIAGNOSIS — T67.5XXA: Primary | ICD-10-CM

## 2018-05-29 DIAGNOSIS — E87.5: ICD-10-CM

## 2018-05-29 DIAGNOSIS — I95.2: Primary | ICD-10-CM
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
## 2018-05-29 DIAGNOSIS — I11.0: ICD-10-CM

## 2018-05-29 LAB
ALBUMIN SERPL BCG-MCNC: 3.9 G/DL (ref 3.5–5)
ALP SERPL-CCNC: 74 U/L (ref 40–150)
ALT SERPL W P-5'-P-CCNC: 11 U/L (ref 8–55)
ANION GAP SERPL CALC-SCNC: 18 MMOL/L (ref 10–20)
ANISOCYTOSIS BLD QL SMEAR: (no result) (100X)
AST SERPL-CCNC: 12 U/L (ref 5–34)
BILIRUB SERPL-MCNC: 0.6 MG/DL (ref 0.2–1.2)
BUN SERPL-MCNC: 52 MG/DL (ref 9.8–20.1)
CALCIUM SERPL-MCNC: 9.4 MG/DL (ref 7.8–10.44)
CHLORIDE SERPL-SCNC: 104 MMOL/L (ref 98–107)
CO2 SERPL-SCNC: 19 MMOL/L (ref 22–29)
CREAT CL PREDICTED SERPL C-G-VRATE: 0 ML/MIN (ref 70–130)
GLOBULIN SER CALC-MCNC: 3.9 G/DL (ref 2.4–3.5)
GLUCOSE SERPL-MCNC: 229 MG/DL (ref 70–105)
HGB BLD-MCNC: 10.7 G/DL (ref 12–16)
MCH RBC QN AUTO: 27.5 PG (ref 27–31)
MCV RBC AUTO: 85.7 FL (ref 81–99)
MDIFF COMPLETE?: YES
PLATELET # BLD AUTO: 194 THOU/UL (ref 130–400)
PLATELET BLD QL SMEAR: (no result)
POTASSIUM SERPL-SCNC: 5.3 MMOL/L (ref 3.5–5.1)
RBC # BLD AUTO: 3.89 MILL/UL (ref 4.2–5.4)
SODIUM SERPL-SCNC: 136 MMOL/L (ref 136–145)
WBC # BLD AUTO: 6.5 THOU/UL (ref 4.8–10.8)

## 2018-05-29 PROCEDURE — 96361 HYDRATE IV INFUSION ADD-ON: CPT

## 2018-05-29 PROCEDURE — 85025 COMPLETE CBC W/AUTO DIFF WBC: CPT

## 2018-05-29 PROCEDURE — 80053 COMPREHEN METABOLIC PANEL: CPT

## 2018-05-29 PROCEDURE — 96374 THER/PROPH/DIAG INJ IV PUSH: CPT

## 2018-05-29 PROCEDURE — 96360 HYDRATION IV INFUSION INIT: CPT

## 2018-08-04 ENCOUNTER — HOSPITAL ENCOUNTER (EMERGENCY)
Dept: HOSPITAL 18 - NAV ERS | Age: 53
Discharge: HOME | End: 2018-08-04
Payer: SELF-PAY

## 2018-08-04 DIAGNOSIS — Z79.84: ICD-10-CM

## 2018-08-04 DIAGNOSIS — R42: ICD-10-CM

## 2018-08-04 DIAGNOSIS — T50.905A: ICD-10-CM

## 2018-08-04 DIAGNOSIS — I95.2: Primary | ICD-10-CM

## 2018-08-04 DIAGNOSIS — I50.9: ICD-10-CM

## 2018-08-04 DIAGNOSIS — E11.9: ICD-10-CM

## 2018-08-04 DIAGNOSIS — Z79.82: ICD-10-CM

## 2018-08-04 DIAGNOSIS — Z79.899: ICD-10-CM

## 2018-08-04 DIAGNOSIS — I11.0: ICD-10-CM

## 2018-08-04 DIAGNOSIS — E78.5: ICD-10-CM

## 2018-08-04 DIAGNOSIS — H61.22: ICD-10-CM

## 2018-08-04 LAB
ALBUMIN SERPL BCG-MCNC: 3.9 G/DL (ref 3.5–5)
ALP SERPL-CCNC: 79 U/L (ref 40–150)
ALT SERPL W P-5'-P-CCNC: 13 U/L (ref 8–55)
ANION GAP SERPL CALC-SCNC: 16 MMOL/L (ref 10–20)
AST SERPL-CCNC: 12 U/L (ref 5–34)
BASOPHILS # BLD AUTO: 0.1 THOU/UL (ref 0–0.2)
BASOPHILS NFR BLD AUTO: 1.6 % (ref 0–1)
BILIRUB SERPL-MCNC: 0.4 MG/DL (ref 0.2–1.2)
BUN SERPL-MCNC: 26 MG/DL (ref 9.8–20.1)
CALCIUM SERPL-MCNC: 9.4 MG/DL (ref 7.8–10.44)
CHLORIDE SERPL-SCNC: 109 MMOL/L (ref 98–107)
CK MB SERPL-MCNC: 1.8 NG/ML (ref 0–6.6)
CK SERPL-CCNC: 195 U/L (ref 29–168)
CO2 SERPL-SCNC: 19 MMOL/L (ref 22–29)
CREAT CL PREDICTED SERPL C-G-VRATE: 0 ML/MIN (ref 70–130)
EOSINOPHIL # BLD AUTO: 0.3 THOU/UL (ref 0–0.7)
EOSINOPHIL NFR BLD AUTO: 4 % (ref 0–10)
GLOBULIN SER CALC-MCNC: 3.2 G/DL (ref 2.4–3.5)
GLUCOSE SERPL-MCNC: 185 MG/DL (ref 70–105)
HGB BLD-MCNC: 10.9 G/DL (ref 12–16)
LYMPHOCYTES # BLD AUTO: 1.6 THOU/UL (ref 1.2–3.4)
LYMPHOCYTES NFR BLD AUTO: 24.4 % (ref 21–51)
MCH RBC QN AUTO: 28.8 PG (ref 27–31)
MCV RBC AUTO: 89.5 FL (ref 78–98)
MONOCYTES # BLD AUTO: 0.5 THOU/UL (ref 0.11–0.59)
MONOCYTES NFR BLD AUTO: 7.6 % (ref 0–10)
NEUTROPHILS # BLD AUTO: 4.1 THOU/UL (ref 1.4–6.5)
NEUTROPHILS NFR BLD AUTO: 62.4 % (ref 42–75)
PLATELET # BLD AUTO: 241 THOU/UL (ref 130–400)
POTASSIUM SERPL-SCNC: 4.4 MMOL/L (ref 3.5–5.1)
PROT UR STRIP.AUTO-MCNC: 30 MG/DL
RBC # BLD AUTO: 3.79 MILL/UL (ref 4.2–5.4)
SODIUM SERPL-SCNC: 140 MMOL/L (ref 136–145)
SP GR UR STRIP: 1.02 (ref 1–1.03)
TROPONIN I SERPL DL<=0.01 NG/ML-MCNC: (no result) NG/ML (ref ?–0.03)
WBC # BLD AUTO: 6.6 THOU/UL (ref 4.8–10.8)
WBC UR QL AUTO: (no result) HPF (ref 0–3)

## 2018-08-04 PROCEDURE — 82553 CREATINE MB FRACTION: CPT

## 2018-08-04 PROCEDURE — 71046 X-RAY EXAM CHEST 2 VIEWS: CPT

## 2018-08-04 PROCEDURE — 81015 MICROSCOPIC EXAM OF URINE: CPT

## 2018-08-04 PROCEDURE — 70450 CT HEAD/BRAIN W/O DYE: CPT

## 2018-08-04 PROCEDURE — 93005 ELECTROCARDIOGRAM TRACING: CPT

## 2018-08-04 PROCEDURE — 80053 COMPREHEN METABOLIC PANEL: CPT

## 2018-08-04 PROCEDURE — 81003 URINALYSIS AUTO W/O SCOPE: CPT

## 2018-08-04 PROCEDURE — 96361 HYDRATE IV INFUSION ADD-ON: CPT

## 2018-08-04 PROCEDURE — 85025 COMPLETE CBC W/AUTO DIFF WBC: CPT

## 2018-08-04 PROCEDURE — 84484 ASSAY OF TROPONIN QUANT: CPT

## 2018-08-04 PROCEDURE — 96360 HYDRATION IV INFUSION INIT: CPT

## 2018-08-04 NOTE — CT
CT BRAIN NONCONTRAST:

 

HISTORY: 

53-year-old female with dizziness and vertigo. 

 

FINDINGS:

There is no midline shift or any other mass effect.  There is no evidence of acute intracranial hemor
rhage, large cortical infarct, obstructive hydrocephalus, or extraaxial fluid collection.  The calvar
ium is intact.

 

IMPRESSION: 

No acute intracranial findings.

 

 

christy [] 

 

POS: [facility]

## 2018-08-04 NOTE — RAD
RADIOGRAPH CHEST 2 VIEWS:

 

HISTORY: 

53-year-old female with dizziness and vertigo. 

 

FINDINGS:

There is no air space density, pulmonary edema, pleural effusion, pneumothorax, or cardiomegaly.

 

IMPRESSION: 

No acute cardiopulmonary findings.

 

 

christy [] 

 

POS: [facility]

## 2018-10-03 ENCOUNTER — HOSPITAL ENCOUNTER (EMERGENCY)
Dept: HOSPITAL 18 - NAV ERS | Age: 53
Discharge: HOME | End: 2018-10-03
Payer: SELF-PAY

## 2018-10-03 DIAGNOSIS — I50.9: ICD-10-CM

## 2018-10-03 DIAGNOSIS — I11.0: ICD-10-CM

## 2018-10-03 DIAGNOSIS — Z79.899: ICD-10-CM

## 2018-10-03 DIAGNOSIS — E78.5: ICD-10-CM

## 2018-10-03 DIAGNOSIS — F43.0: Primary | ICD-10-CM

## 2018-10-03 DIAGNOSIS — E11.9: ICD-10-CM

## 2018-10-03 DIAGNOSIS — Z79.82: ICD-10-CM

## 2018-10-03 DIAGNOSIS — Z79.84: ICD-10-CM

## 2018-10-03 PROCEDURE — 99283 EMERGENCY DEPT VISIT LOW MDM: CPT

## 2018-10-22 ENCOUNTER — HOSPITAL ENCOUNTER (OUTPATIENT)
Dept: HOSPITAL 92 - CCL | Age: 53
Discharge: HOME | End: 2018-10-22
Attending: INTERNAL MEDICINE
Payer: COMMERCIAL

## 2018-10-22 VITALS — BODY MASS INDEX: 34 KG/M2

## 2018-10-22 DIAGNOSIS — I43: ICD-10-CM

## 2018-10-22 DIAGNOSIS — E11.9: ICD-10-CM

## 2018-10-22 DIAGNOSIS — Z79.82: ICD-10-CM

## 2018-10-22 DIAGNOSIS — Z79.899: ICD-10-CM

## 2018-10-22 DIAGNOSIS — I11.0: Primary | ICD-10-CM

## 2018-10-22 DIAGNOSIS — I50.22: ICD-10-CM

## 2018-10-22 DIAGNOSIS — Z79.84: ICD-10-CM

## 2018-10-22 LAB
ANION GAP SERPL CALC-SCNC: 11 MMOL/L (ref 10–20)
APTT PPP: 41.6 SEC (ref 22.9–36.1)
BASOPHILS # BLD AUTO: 0.1 THOU/UL (ref 0–0.2)
BASOPHILS NFR BLD AUTO: 1.3 % (ref 0–1)
BUN SERPL-MCNC: 16 MG/DL (ref 9.8–20.1)
CALCIUM SERPL-MCNC: 9.5 MG/DL (ref 7.8–10.44)
CHLORIDE SERPL-SCNC: 106 MMOL/L (ref 98–107)
CO2 SERPL-SCNC: 27 MMOL/L (ref 22–29)
CREAT CL PREDICTED SERPL C-G-VRATE: 109 ML/MIN (ref 70–130)
EOSINOPHIL # BLD AUTO: 0.4 THOU/UL (ref 0–0.7)
EOSINOPHIL NFR BLD AUTO: 5.5 % (ref 0–10)
GLUCOSE SERPL-MCNC: 119 MG/DL (ref 70–105)
HGB BLD-MCNC: 11.1 G/DL (ref 12–16)
INR PPP: 1
LYMPHOCYTES # BLD: 1.8 THOU/UL (ref 1.2–3.4)
LYMPHOCYTES NFR BLD AUTO: 27.3 % (ref 21–51)
MCH RBC QN AUTO: 30.1 PG (ref 27–31)
MCV RBC AUTO: 92.3 FL (ref 78–98)
MONOCYTES # BLD AUTO: 0.5 THOU/UL (ref 0.11–0.59)
MONOCYTES NFR BLD AUTO: 6.8 % (ref 0–10)
NEUTROPHILS # BLD AUTO: 3.9 THOU/UL (ref 1.4–6.5)
NEUTROPHILS NFR BLD AUTO: 59.1 % (ref 42–75)
PLATELET # BLD AUTO: 228 THOU/UL (ref 130–400)
POTASSIUM SERPL-SCNC: 4.1 MMOL/L (ref 3.5–5.1)
PROTHROMBIN TIME: 13.6 SEC (ref 12–14.7)
RBC # BLD AUTO: 3.7 MILL/UL (ref 4.2–5.4)
SODIUM SERPL-SCNC: 140 MMOL/L (ref 136–145)
WBC # BLD AUTO: 6.6 THOU/UL (ref 4.8–10.8)

## 2018-10-22 PROCEDURE — 85730 THROMBOPLASTIN TIME PARTIAL: CPT

## 2018-10-22 PROCEDURE — 33249 INSJ/RPLCMT DEFIB W/LEAD(S): CPT

## 2018-10-22 PROCEDURE — C1777 LEAD, AICD, ENDO SINGLE COIL: HCPCS

## 2018-10-22 PROCEDURE — 0JH60PZ INSERTION OF CARDIAC RHYTHM RELATED DEVICE INTO CHEST SUBCUTANEOUS TISSUE AND FASCIA, OPEN APPROACH: ICD-10-PCS | Performed by: INTERNAL MEDICINE

## 2018-10-22 PROCEDURE — 36005 INJECTION EXT VENOGRAPHY: CPT

## 2018-10-22 PROCEDURE — 71045 X-RAY EXAM CHEST 1 VIEW: CPT

## 2018-10-22 PROCEDURE — 0JH608Z INSERTION OF DEFIBRILLATOR GENERATOR INTO CHEST SUBCUTANEOUS TISSUE AND FASCIA, OPEN APPROACH: ICD-10-PCS | Performed by: INTERNAL MEDICINE

## 2018-10-22 PROCEDURE — 85610 PROTHROMBIN TIME: CPT

## 2018-10-22 PROCEDURE — C1722 AICD, SINGLE CHAMBER: HCPCS

## 2018-10-22 PROCEDURE — 80048 BASIC METABOLIC PNL TOTAL CA: CPT

## 2018-10-22 PROCEDURE — 36415 COLL VENOUS BLD VENIPUNCTURE: CPT

## 2018-10-22 PROCEDURE — 75820 VEIN X-RAY ARM/LEG: CPT

## 2018-10-22 PROCEDURE — 93010 ELECTROCARDIOGRAM REPORT: CPT

## 2018-10-22 PROCEDURE — 93005 ELECTROCARDIOGRAM TRACING: CPT

## 2018-10-22 PROCEDURE — 85025 COMPLETE CBC W/AUTO DIFF WBC: CPT

## 2018-11-27 ENCOUNTER — HOSPITAL ENCOUNTER (EMERGENCY)
Dept: HOSPITAL 18 - NAV ERS | Age: 53
Discharge: HOME | End: 2018-11-27
Payer: COMMERCIAL

## 2018-11-27 DIAGNOSIS — E78.5: ICD-10-CM

## 2018-11-27 DIAGNOSIS — E86.0: ICD-10-CM

## 2018-11-27 DIAGNOSIS — I50.9: ICD-10-CM

## 2018-11-27 DIAGNOSIS — Z79.82: ICD-10-CM

## 2018-11-27 DIAGNOSIS — E11.9: ICD-10-CM

## 2018-11-27 DIAGNOSIS — Z79.899: ICD-10-CM

## 2018-11-27 DIAGNOSIS — I11.0: ICD-10-CM

## 2018-11-27 DIAGNOSIS — Z79.84: ICD-10-CM

## 2018-11-27 DIAGNOSIS — I95.1: Primary | ICD-10-CM

## 2018-11-27 LAB
ALBUMIN SERPL BCG-MCNC: 3.5 G/DL (ref 3.5–5)
ALP SERPL-CCNC: 58 U/L (ref 40–150)
ALT SERPL W P-5'-P-CCNC: 9 U/L (ref 8–55)
ANION GAP SERPL CALC-SCNC: 14 MMOL/L (ref 10–20)
AST SERPL-CCNC: 10 U/L (ref 5–34)
BACTERIA UR QL AUTO: (no result) HPF
BASOPHILS # BLD AUTO: 0.1 THOU/UL (ref 0–0.2)
BASOPHILS NFR BLD AUTO: 0.9 % (ref 0–1)
BILIRUB SERPL-MCNC: 0.3 MG/DL (ref 0.2–1.2)
BUN SERPL-MCNC: 20 MG/DL (ref 9.8–20.1)
CALCIUM SERPL-MCNC: 8.9 MG/DL (ref 7.8–10.44)
CHLORIDE SERPL-SCNC: 107 MMOL/L (ref 98–107)
CO2 SERPL-SCNC: 23 MMOL/L (ref 22–29)
CREAT CL PREDICTED SERPL C-G-VRATE: 0 ML/MIN (ref 70–130)
EOSINOPHIL # BLD AUTO: 0.2 THOU/UL (ref 0–0.7)
EOSINOPHIL NFR BLD AUTO: 3.1 % (ref 0–10)
GLOBULIN SER CALC-MCNC: 3.3 G/DL (ref 2.4–3.5)
GLUCOSE SERPL-MCNC: 170 MG/DL (ref 70–105)
HGB BLD-MCNC: 10.2 G/DL (ref 12–16)
LYMPHOCYTES # BLD AUTO: 1.4 THOU/UL (ref 1.2–3.4)
LYMPHOCYTES NFR BLD AUTO: 24 % (ref 21–51)
MAGNESIUM SERPL-MCNC: 1.6 MG/DL (ref 1.6–2.6)
MCH RBC QN AUTO: 29.2 PG (ref 27–31)
MCV RBC AUTO: 90.5 FL (ref 78–98)
MONOCYTES # BLD AUTO: 0.6 THOU/UL (ref 0.11–0.59)
MONOCYTES NFR BLD AUTO: 9.6 % (ref 0–10)
NEUTROPHILS # BLD AUTO: 3.6 THOU/UL (ref 1.4–6.5)
NEUTROPHILS NFR BLD AUTO: 62.4 % (ref 42–75)
PLATELET # BLD AUTO: 191 THOU/UL (ref 130–400)
POTASSIUM SERPL-SCNC: 4.1 MMOL/L (ref 3.5–5.1)
PROT UR STRIP.AUTO-MCNC: 30 MG/DL
RBC # BLD AUTO: 3.5 MILL/UL (ref 4.2–5.4)
SODIUM SERPL-SCNC: 140 MMOL/L (ref 136–145)
SP GR UR STRIP: 1.02 (ref 1–1.03)
TROPONIN I SERPL DL<=0.01 NG/ML-MCNC: (no result) NG/ML (ref ?–0.03)
URNS CMNT MICRO: NO
WBC # BLD AUTO: 5.7 THOU/UL (ref 4.8–10.8)
WBC UR QL AUTO: (no result) HPF (ref 0–3)

## 2018-11-27 PROCEDURE — 93005 ELECTROCARDIOGRAM TRACING: CPT

## 2018-11-27 PROCEDURE — 36416 COLLJ CAPILLARY BLOOD SPEC: CPT

## 2018-11-27 PROCEDURE — 84484 ASSAY OF TROPONIN QUANT: CPT

## 2018-11-27 PROCEDURE — 96360 HYDRATION IV INFUSION INIT: CPT

## 2018-11-27 PROCEDURE — 71046 X-RAY EXAM CHEST 2 VIEWS: CPT

## 2018-11-27 PROCEDURE — 83605 ASSAY OF LACTIC ACID: CPT

## 2018-11-27 PROCEDURE — 83880 ASSAY OF NATRIURETIC PEPTIDE: CPT

## 2018-11-27 PROCEDURE — 80053 COMPREHEN METABOLIC PANEL: CPT

## 2018-11-27 PROCEDURE — 94760 N-INVAS EAR/PLS OXIMETRY 1: CPT

## 2018-11-27 PROCEDURE — 83735 ASSAY OF MAGNESIUM: CPT

## 2018-11-27 PROCEDURE — 85025 COMPLETE CBC W/AUTO DIFF WBC: CPT

## 2018-11-27 PROCEDURE — 81003 URINALYSIS AUTO W/O SCOPE: CPT

## 2018-11-27 PROCEDURE — 96361 HYDRATE IV INFUSION ADD-ON: CPT

## 2018-11-27 PROCEDURE — 81015 MICROSCOPIC EXAM OF URINE: CPT

## 2019-01-20 ENCOUNTER — HOSPITAL ENCOUNTER (EMERGENCY)
Dept: HOSPITAL 18 - NAV ERS | Age: 54
Discharge: HOME | End: 2019-01-20
Payer: COMMERCIAL

## 2019-01-20 DIAGNOSIS — E78.5: ICD-10-CM

## 2019-01-20 DIAGNOSIS — Z79.899: ICD-10-CM

## 2019-01-20 DIAGNOSIS — I50.9: ICD-10-CM

## 2019-01-20 DIAGNOSIS — Z79.84: ICD-10-CM

## 2019-01-20 DIAGNOSIS — I11.0: ICD-10-CM

## 2019-01-20 DIAGNOSIS — Z79.82: ICD-10-CM

## 2019-01-20 DIAGNOSIS — I95.9: Primary | ICD-10-CM

## 2019-01-20 DIAGNOSIS — Z79.891: ICD-10-CM

## 2019-01-20 DIAGNOSIS — E11.9: ICD-10-CM

## 2019-01-20 PROCEDURE — 99283 EMERGENCY DEPT VISIT LOW MDM: CPT

## 2019-03-22 ENCOUNTER — HOSPITAL ENCOUNTER (EMERGENCY)
Dept: HOSPITAL 18 - NAV ERS | Age: 54
Discharge: HOME | End: 2019-03-22
Payer: COMMERCIAL

## 2019-03-22 DIAGNOSIS — I11.0: ICD-10-CM

## 2019-03-22 DIAGNOSIS — Z79.82: ICD-10-CM

## 2019-03-22 DIAGNOSIS — D64.9: ICD-10-CM

## 2019-03-22 DIAGNOSIS — E11.649: ICD-10-CM

## 2019-03-22 DIAGNOSIS — E86.0: ICD-10-CM

## 2019-03-22 DIAGNOSIS — I50.9: ICD-10-CM

## 2019-03-22 DIAGNOSIS — I95.89: Primary | ICD-10-CM

## 2019-03-22 DIAGNOSIS — Z79.899: ICD-10-CM

## 2019-03-22 LAB
ALBUMIN SERPL BCG-MCNC: 3.2 G/DL (ref 3.5–5)
ALP SERPL-CCNC: 71 U/L (ref 40–150)
ALT SERPL W P-5'-P-CCNC: 12 U/L (ref 8–55)
ANION GAP SERPL CALC-SCNC: 14 MMOL/L (ref 10–20)
AST SERPL-CCNC: 14 U/L (ref 5–34)
BASOPHILS # BLD AUTO: 0.1 THOU/UL (ref 0–0.2)
BASOPHILS NFR BLD AUTO: 1.2 % (ref 0–1)
BILIRUB SERPL-MCNC: 0.3 MG/DL (ref 0.2–1.2)
BUN SERPL-MCNC: 23 MG/DL (ref 9.8–20.1)
CALCIUM SERPL-MCNC: 8.6 MG/DL (ref 7.8–10.44)
CHLORIDE SERPL-SCNC: 109 MMOL/L (ref 98–107)
CO2 SERPL-SCNC: 21 MMOL/L (ref 22–29)
CREAT CL PREDICTED SERPL C-G-VRATE: 0 ML/MIN (ref 70–130)
EOSINOPHIL # BLD AUTO: 0.2 THOU/UL (ref 0–0.7)
EOSINOPHIL NFR BLD AUTO: 3.8 % (ref 0–10)
GLOBULIN SER CALC-MCNC: 3.5 G/DL (ref 2.4–3.5)
GLUCOSE SERPL-MCNC: 135 MG/DL (ref 70–105)
HGB BLD-MCNC: 9.3 G/DL (ref 12–16)
LYMPHOCYTES # BLD AUTO: 1 THOU/UL (ref 1.2–3.4)
LYMPHOCYTES NFR BLD AUTO: 17.5 % (ref 21–51)
MCH RBC QN AUTO: 28.7 PG (ref 27–31)
MCV RBC AUTO: 90.5 FL (ref 78–98)
MONOCYTES # BLD AUTO: 0.5 THOU/UL (ref 0.11–0.59)
MONOCYTES NFR BLD AUTO: 8.9 % (ref 0–10)
NEUTROPHILS # BLD AUTO: 3.9 THOU/UL (ref 1.4–6.5)
NEUTROPHILS NFR BLD AUTO: 68.7 % (ref 42–75)
PLATELET # BLD AUTO: 162 THOU/UL (ref 130–400)
POTASSIUM SERPL-SCNC: 4.6 MMOL/L (ref 3.5–5.1)
RBC # BLD AUTO: 3.24 MILL/UL (ref 4.2–5.4)
SODIUM SERPL-SCNC: 139 MMOL/L (ref 136–145)
WBC # BLD AUTO: 5.7 THOU/UL (ref 4.8–10.8)

## 2019-03-22 PROCEDURE — 36416 COLLJ CAPILLARY BLOOD SPEC: CPT

## 2019-03-22 PROCEDURE — 80053 COMPREHEN METABOLIC PANEL: CPT

## 2019-03-22 PROCEDURE — 71045 X-RAY EXAM CHEST 1 VIEW: CPT

## 2019-03-22 PROCEDURE — 93005 ELECTROCARDIOGRAM TRACING: CPT

## 2019-03-22 PROCEDURE — 83880 ASSAY OF NATRIURETIC PEPTIDE: CPT

## 2019-03-22 PROCEDURE — 84484 ASSAY OF TROPONIN QUANT: CPT

## 2019-03-22 PROCEDURE — 85025 COMPLETE CBC W/AUTO DIFF WBC: CPT

## 2019-03-22 NOTE — RAD
SINGLE VIEW CHEST:

 

Date:  03/22/19 

 

COMPARISON:  

10/22/18. 

 

HISTORY:  

Hypotension and low blood sugar. 

 

FINDINGS:

Single view of the chest shows a normal sized cardiomediastinal silhouette. The pacemaker is unchange
d in position. There is no evidence of consolidation, mass, or pleural effusion. 

 

IMPRESSION: 

No evidence of acute cardiopulmonary disease. 

 

 

POS: [facility]

## 2019-05-21 ENCOUNTER — HOSPITAL ENCOUNTER (EMERGENCY)
Dept: HOSPITAL 18 - NAV ERS | Age: 54
Discharge: HOME | End: 2019-05-21
Payer: COMMERCIAL

## 2019-05-21 DIAGNOSIS — R51: ICD-10-CM

## 2019-05-21 DIAGNOSIS — Z79.899: ICD-10-CM

## 2019-05-21 DIAGNOSIS — E11.65: Primary | ICD-10-CM

## 2019-05-21 DIAGNOSIS — I11.0: ICD-10-CM

## 2019-05-21 DIAGNOSIS — I50.9: ICD-10-CM

## 2019-05-21 DIAGNOSIS — E78.5: ICD-10-CM

## 2019-05-21 LAB
ALBUMIN SERPL BCG-MCNC: 3.4 G/DL (ref 3.5–5)
ALP SERPL-CCNC: 78 U/L (ref 40–150)
ALT SERPL W P-5'-P-CCNC: 11 U/L (ref 8–55)
ANION GAP SERPL CALC-SCNC: 15 MMOL/L (ref 10–20)
AST SERPL-CCNC: 15 U/L (ref 5–34)
BACTERIA UR QL AUTO: (no result) HPF
BASOPHILS # BLD AUTO: 0.1 THOU/UL (ref 0–0.2)
BASOPHILS NFR BLD AUTO: 1.2 % (ref 0–1)
BILIRUB SERPL-MCNC: 0.4 MG/DL (ref 0.2–1.2)
BUN SERPL-MCNC: 22 MG/DL (ref 9.8–20.1)
CALCIUM SERPL-MCNC: 8.5 MG/DL (ref 7.8–10.44)
CHLORIDE SERPL-SCNC: 106 MMOL/L (ref 98–107)
CO2 SERPL-SCNC: 24 MMOL/L (ref 22–29)
CREAT CL PREDICTED SERPL C-G-VRATE: 0 ML/MIN (ref 70–130)
EOSINOPHIL # BLD AUTO: 0.3 THOU/UL (ref 0–0.7)
EOSINOPHIL NFR BLD AUTO: 4.3 % (ref 0–10)
GLOBULIN SER CALC-MCNC: 3.2 G/DL (ref 2.4–3.5)
GLUCOSE SERPL-MCNC: 258 MG/DL (ref 70–105)
HGB BLD-MCNC: 9.9 G/DL (ref 12–16)
LYMPHOCYTES # BLD AUTO: 1.6 THOU/UL (ref 1.2–3.4)
LYMPHOCYTES NFR BLD AUTO: 23.5 % (ref 21–51)
MCH RBC QN AUTO: 28.1 PG (ref 27–31)
MCV RBC AUTO: 90.6 FL (ref 78–98)
MONOCYTES # BLD AUTO: 0.4 THOU/UL (ref 0.11–0.59)
MONOCYTES NFR BLD AUTO: 6.4 % (ref 0–10)
NEUTROPHILS # BLD AUTO: 4.3 THOU/UL (ref 1.4–6.5)
NEUTROPHILS NFR BLD AUTO: 64.6 % (ref 42–75)
PLATELET # BLD AUTO: 205 THOU/UL (ref 130–400)
POTASSIUM SERPL-SCNC: 4.6 MMOL/L (ref 3.5–5.1)
PROT UR STRIP.AUTO-MCNC: 100 MG/DL
RBC # BLD AUTO: 3.53 MILL/UL (ref 4.2–5.4)
SODIUM SERPL-SCNC: 140 MMOL/L (ref 136–145)
SP GR UR STRIP: 1.02 (ref 1–1.03)
URNS CMNT MICRO: NO
WBC # BLD AUTO: 6.7 THOU/UL (ref 4.8–10.8)
WBC UR QL AUTO: (no result) HPF (ref 0–3)

## 2019-05-21 PROCEDURE — 80053 COMPREHEN METABOLIC PANEL: CPT

## 2019-05-21 PROCEDURE — 85025 COMPLETE CBC W/AUTO DIFF WBC: CPT

## 2019-05-21 PROCEDURE — 36416 COLLJ CAPILLARY BLOOD SPEC: CPT

## 2019-05-21 PROCEDURE — 93005 ELECTROCARDIOGRAM TRACING: CPT

## 2019-05-21 PROCEDURE — 81003 URINALYSIS AUTO W/O SCOPE: CPT

## 2019-05-21 PROCEDURE — 81015 MICROSCOPIC EXAM OF URINE: CPT

## 2020-06-10 ENCOUNTER — HOSPITAL ENCOUNTER (EMERGENCY)
Dept: HOSPITAL 18 - NAV ERS | Age: 55
Discharge: HOME | End: 2020-06-10
Payer: SELF-PAY

## 2020-06-10 DIAGNOSIS — D50.9: ICD-10-CM

## 2020-06-10 DIAGNOSIS — Z79.84: ICD-10-CM

## 2020-06-10 DIAGNOSIS — I11.0: ICD-10-CM

## 2020-06-10 DIAGNOSIS — I50.9: ICD-10-CM

## 2020-06-10 DIAGNOSIS — R05: ICD-10-CM

## 2020-06-10 DIAGNOSIS — R06.7: ICD-10-CM

## 2020-06-10 DIAGNOSIS — Z20.828: ICD-10-CM

## 2020-06-10 DIAGNOSIS — E11.9: ICD-10-CM

## 2020-06-10 DIAGNOSIS — Z79.82: ICD-10-CM

## 2020-06-10 DIAGNOSIS — R68.83: Primary | ICD-10-CM

## 2020-06-10 DIAGNOSIS — E78.5: ICD-10-CM

## 2020-06-10 DIAGNOSIS — Z79.899: ICD-10-CM

## 2020-06-10 DIAGNOSIS — Z79.891: ICD-10-CM

## 2020-06-10 PROCEDURE — 99283 EMERGENCY DEPT VISIT LOW MDM: CPT

## 2020-12-28 ENCOUNTER — HOSPITAL ENCOUNTER (INPATIENT)
Dept: HOSPITAL 92 - ERS | Age: 55
LOS: 1 days | Discharge: HOME | DRG: 312 | End: 2020-12-29
Attending: FAMILY MEDICINE | Admitting: FAMILY MEDICINE
Payer: MEDICARE

## 2020-12-28 ENCOUNTER — HOSPITAL ENCOUNTER (EMERGENCY)
Dept: HOSPITAL 18 - NAV ERS | Age: 55
Discharge: ANOTHER HOSPITAL | End: 2020-12-28
Payer: SELF-PAY

## 2020-12-28 VITALS — BODY MASS INDEX: 38.9 KG/M2

## 2020-12-28 DIAGNOSIS — I42.8: ICD-10-CM

## 2020-12-28 DIAGNOSIS — I95.1: Primary | ICD-10-CM

## 2020-12-28 DIAGNOSIS — I11.0: ICD-10-CM

## 2020-12-28 DIAGNOSIS — N39.0: ICD-10-CM

## 2020-12-28 DIAGNOSIS — E11.9: ICD-10-CM

## 2020-12-28 DIAGNOSIS — I50.22: ICD-10-CM

## 2020-12-28 DIAGNOSIS — D64.9: ICD-10-CM

## 2020-12-28 DIAGNOSIS — Z79.84: ICD-10-CM

## 2020-12-28 DIAGNOSIS — Z79.82: ICD-10-CM

## 2020-12-28 DIAGNOSIS — I50.9: ICD-10-CM

## 2020-12-28 DIAGNOSIS — D50.9: ICD-10-CM

## 2020-12-28 DIAGNOSIS — E78.5: ICD-10-CM

## 2020-12-28 DIAGNOSIS — Z79.899: ICD-10-CM

## 2020-12-28 DIAGNOSIS — E87.2: ICD-10-CM

## 2020-12-28 DIAGNOSIS — R00.1: ICD-10-CM

## 2020-12-28 DIAGNOSIS — E86.0: ICD-10-CM

## 2020-12-28 DIAGNOSIS — I95.9: Primary | ICD-10-CM

## 2020-12-28 DIAGNOSIS — Z95.1: ICD-10-CM

## 2020-12-28 LAB
ALBUMIN SERPL BCG-MCNC: 3.7 G/DL (ref 3.5–5)
ALP SERPL-CCNC: 94 U/L (ref 40–110)
ALT SERPL W P-5'-P-CCNC: 9 U/L (ref 8–55)
ANION GAP SERPL CALC-SCNC: 16 MMOL/L (ref 10–20)
AST SERPL-CCNC: 7 U/L (ref 5–34)
BACTERIA UR QL AUTO: (no result) HPF
BASOPHILS # BLD AUTO: 0.1 THOU/UL (ref 0–0.2)
BASOPHILS NFR BLD AUTO: 0.9 % (ref 0–1)
BILIRUB SERPL-MCNC: 0.2 MG/DL (ref 0.2–1.2)
BUN SERPL-MCNC: 28 MG/DL (ref 9.8–20.1)
CALCIUM SERPL-MCNC: 8.8 MG/DL (ref 7.8–10.44)
CHLORIDE SERPL-SCNC: 108 MMOL/L (ref 98–107)
CO2 SERPL-SCNC: 19 MMOL/L (ref 22–29)
CREAT CL PREDICTED SERPL C-G-VRATE: 0 ML/MIN (ref 70–130)
EOSINOPHIL # BLD AUTO: 0.3 THOU/UL (ref 0–0.7)
EOSINOPHIL NFR BLD AUTO: 5.2 % (ref 0–10)
GLOBULIN SER CALC-MCNC: 3.9 G/DL (ref 2.4–3.5)
GLUCOSE SERPL-MCNC: 232 MG/DL (ref 70–105)
GLUCOSE UR STRIP-MCNC: >=1000 MG/DL
HGB BLD-MCNC: 10.1 G/DL (ref 12–16)
LYMPHOCYTES # BLD AUTO: 1.3 THOU/UL (ref 1.2–3.4)
LYMPHOCYTES NFR BLD AUTO: 20.5 % (ref 21–51)
MCH RBC QN AUTO: 28 PG (ref 27–31)
MCV RBC AUTO: 93.1 FL (ref 78–98)
MONOCYTES # BLD AUTO: 0.4 THOU/UL (ref 0.11–0.59)
MONOCYTES NFR BLD AUTO: 7.1 % (ref 0–10)
NEUTROPHILS # BLD AUTO: 4.1 THOU/UL (ref 1.4–6.5)
NEUTROPHILS NFR BLD AUTO: 66.2 % (ref 42–75)
PLATELET # BLD AUTO: 220 THOU/UL (ref 130–400)
POTASSIUM SERPL-SCNC: 4.2 MMOL/L (ref 3.5–5.1)
RBC # BLD AUTO: 3.62 MILL/UL (ref 4.2–5.4)
RBC UR QL AUTO: (no result) HPF (ref 0–3)
SODIUM SERPL-SCNC: 139 MMOL/L (ref 136–145)
SP GR UR STRIP: 1.02 (ref 1–1.03)
WBC # BLD AUTO: 6.2 THOU/UL (ref 4.8–10.8)
WBC UR QL AUTO: (no result) HPF (ref 0–3)

## 2020-12-28 PROCEDURE — 36415 COLL VENOUS BLD VENIPUNCTURE: CPT

## 2020-12-28 PROCEDURE — 99285 EMERGENCY DEPT VISIT HI MDM: CPT

## 2020-12-28 PROCEDURE — 80048 BASIC METABOLIC PNL TOTAL CA: CPT

## 2020-12-28 PROCEDURE — U0003 INFECTIOUS AGENT DETECTION BY NUCLEIC ACID (DNA OR RNA); SEVERE ACUTE RESPIRATORY SYNDROME CORONAVIRUS 2 (SARS-COV-2) (CORONAVIRUS DISEASE [COVID-19]), AMPLIFIED PROBE TECHNIQUE, MAKING USE OF HIGH THROUGHPUT TECHNOLOGIES AS DESCRIBED BY CMS-2020-01-R: HCPCS

## 2020-12-28 PROCEDURE — 81003 URINALYSIS AUTO W/O SCOPE: CPT

## 2020-12-28 PROCEDURE — 71046 X-RAY EXAM CHEST 2 VIEWS: CPT

## 2020-12-28 PROCEDURE — 84484 ASSAY OF TROPONIN QUANT: CPT

## 2020-12-28 PROCEDURE — 83605 ASSAY OF LACTIC ACID: CPT

## 2020-12-28 PROCEDURE — 85025 COMPLETE CBC W/AUTO DIFF WBC: CPT

## 2020-12-28 PROCEDURE — 81015 MICROSCOPIC EXAM OF URINE: CPT

## 2020-12-28 PROCEDURE — 36416 COLLJ CAPILLARY BLOOD SPEC: CPT

## 2020-12-28 PROCEDURE — 87086 URINE CULTURE/COLONY COUNT: CPT

## 2020-12-28 PROCEDURE — 80053 COMPREHEN METABOLIC PANEL: CPT

## 2020-12-28 PROCEDURE — 87635 SARS-COV-2 COVID-19 AMP PRB: CPT

## 2020-12-28 PROCEDURE — 93005 ELECTROCARDIOGRAM TRACING: CPT

## 2020-12-28 PROCEDURE — 96365 THER/PROPH/DIAG IV INF INIT: CPT

## 2020-12-28 RX ADMIN — SACUBITRIL AND VALSARTAN SCH: 49; 51 TABLET, FILM COATED ORAL at 22:24

## 2020-12-28 NOTE — HP
PRIMARY CARE PHYSICIAN:  [facility].



CHIEF COMPLAINT:  Dizziness.



HISTORY OF PRESENT ILLNESS:  This is a 55-year-old  female who has a

history of nonischemic cardiomyopathy for the last 2 years with ICD and pacemaker

placed, also with diabetes mellitus type 2.  She was in her normal state of health

until when she checked her blood pressure this morning, it came out low, after which

she noticed that she was feeling a little bit dizzy, worse when she would stand up.

She was still able to ambulate without falling.  She called her clinic and they

could not work her in today, so she went into the emergency room in [place].

There, she was found to have initial systolic blood pressure reading of 89/44.  She

was given some fluids there.  The patient was also noted to be orthostatic and her

pulse upon standing dropped to 50s in spite of her pacemaker, so she was sent over

here for evaluation.  The patient also had a mildly elevated lactic acid and

minimally dirty urine, cultures were done, she was given doses of Rocephin before

transport.  In the emergency room here, the patient had her pacemaker interrogated,

that showed only one episode of 1 second of v tach months ago and has been working

well otherwise.  The patient is feeling a little better after the fluids.  She is

being put in observation in the hospital to be watched overnight. 



REVIEW OF SYSTEMS:  CONSTITUTIONAL:  No fevers.  No chills. 

EYES:  No double vision or blurred vision. 

ENT:  No congestion, drainage, or sore throat. 

CARDIOVASCULAR:  No chest pain.  No palpitations or racing heart. 

PULMONARY:  No coughing, wheezing, or shortness of breath. 

GASTROINTESTINAL:  No abdominal pain.  No nausea or vomiting.  No diarrhea or

constipation. 

GENITOURINARY:  No dysuria or hematuria. 

MUSCULOSKELETAL:  No muscle aches or joint pain. 

SKIN:  No rashes or lesions noted. 

NEUROLOGIC:  See HPI.  No numbness, tingling, or focal weakness.



PAST MEDICAL HISTORY:  

1. Nonischemic cardiomyopathy.

2. Diabetes mellitus, type 2, on oral hypoglycemics.

3. Iron-deficiency anemia.

4. Hypertension.

5. Hyperlipidemia.



PAST SURGICAL HISTORY:  Pacemaker and defibrillator placed.



SOCIAL HISTORY:  No tobacco, alcohol, or illicit drug use.  She is a full code.

Should she be incapacitated, her daughter would be her medical decision maker, her

name is Kalani Escoto. 



FAMILY HISTORY:  Mother  of a myocardial infarction at age 83.  Dad had coronary

artery disease starting in his later years. 



ALLERGIES:  NO KNOWN DRUG ALLERGIES.



CURRENT MEDICATIONS:  

1. Isosorbide dinitrate 20 mg twice a day.

2. Farxiga 5 mg daily.

3. Atorvastatin 40 mg daily.

4. Aspirin 325 mg twice a day.

5. Entresto 97/103 mg twice a day.

6. Spironolactone and hydrochlorothiazide 25/25 mg one tablet daily.

7. Ferrous sulfate one tablet daily.

8. Metformin 500 mg twice a day.

9. Carvedilol 12.5 mg twice a day.



PHYSICAL EXAMINATION:

VITAL SIGNS:  Blood pressure 139/81, pulse 80, respirations 16, temperature 98.3,

and O2 saturation 99% on room air. 

GENERAL:  This is a well-developed, well-nourished  female, in no

acute distress. 

HEENT:  Pupils equal, round, and reactive to light.  Oropharynx clear without

lesions, erythema, or exudate. 

NECK:  Supple.  No lymphadenopathy.  No thyroid nodules or enlargement.  No JVD. 

HEART:  Regular rate and rhythm.  No murmurs, rubs, or gallops. 

LUNGS:  Clear to auscultation bilaterally.  No wheezes, crackles, or rhonchi. 

ABDOMEN:  Soft, nontender to palpation.  Normoactive bowel sounds.  No

hepatosplenomegaly or other masses. 

EXTREMITIES:  No clubbing, cyanosis, or edema. 

SKIN:  No rashes or lesions noted. 

NEUROLOGIC:  The patient moves all extremities equally.  No facial droop. 

PSYCHIATRIC:  Alert and oriented x3.  Normal mood and affect.



LABORATORY DATA:  CBC with a hemoglobin of 10.1, hematocrit of 33.7, the rest is

normal.  Complete metabolic panel is notable for chloride of 108, carbon dioxide of

19, BUN of 28, creatinine of 1.16, which is elevated for her, and glucose of 232,

the rest was normal.  Lactic acid was initially 2.3, is down to 1.8 on recheck after

fluids.  Troponin was negative x1.  Urinalysis showed trace leukocyte esterase, 7 to

10 white blood cells, 4 to 6 squamous epithelials, and rare to few bacteria, she did

have a lot of sugar in her urine.  EKG done in the emergency room shows normal sinus

rhythm at 75 beats per minute without any significant ST-segment changes or T-wave

inversions.  Chest x-ray, I did review the chest x-ray done in the emergency room

along with the radiologist's report, does show no acute cardiopulmonary process,

normal chest x-ray. 



ASSESSMENT:  

1. Orthostatic hypotension with mild lactic acidosis.  The patient has normal white

blood cell count and not very impressive looking urine.  Suspect that she was a

little bit volume depleted from her diuretics for her congestive heart failure.  We

will observe her closely in the hospital overnight.  We will check orthostatics on

her after resuscitation.  We will make sure she is safe to ambulate and does not

have any cardiac issues on the monitor overnight.  If she is ambulating well in the

morning and her blood pressure is doing well, then she can likely be discharged home

at that time. 

2. Chronic systolic congestive heart failure.  We will observe closely after the

volume resuscitation in the emergency room.  At this point, she does not look like

she is volume overloaded, so just resume her home medications. 

3. Hypertension.  Resume the patient's home blood pressure medications.

4. Diabetes mellitus, type 2.  Resume the patient's metformin and we will put her on

fingerstick blood sugars q.a.c. and at bedtime with a low insulin sliding scale. 

5. Gastrointestinal prophylaxis.  Put the patient on Pepcid twice a day.

6. Deep venous thrombosis prophylaxis.  Put the patient on Lovenox while she is in

the hospital. 

7. Code status.  The patient is a full code.  Should she be incapacitated, her

daughter would be her medical decision maker. 







Job ID:  [id number]

## 2020-12-28 NOTE — RAD
RADIOGRAPH CHEST 2 VIEWS:



DATE:

12/28/2020



HISTORY:

55-year-old female with sepsis



FINDINGS:

There is no airspace density, pulmonary edema, pleural effusion, pneumothorax, or cardiomegaly. Singl
e lead ICD with left-sided generator.



IMPRESSION:

No acute cardiopulmonary findings.



Reported By: Murphy Kline 

Electronically Signed:  12/28/2020 12:28 PM

## 2020-12-29 VITALS — DIASTOLIC BLOOD PRESSURE: 67 MMHG | SYSTOLIC BLOOD PRESSURE: 120 MMHG | TEMPERATURE: 98.2 F

## 2020-12-29 LAB
ANION GAP SERPL CALC-SCNC: 14 MMOL/L (ref 10–20)
BASOPHILS # BLD AUTO: 0.1 THOU/UL (ref 0–0.2)
BASOPHILS NFR BLD AUTO: 0.7 % (ref 0–1)
BUN SERPL-MCNC: 25 MG/DL (ref 9.8–20.1)
CALCIUM SERPL-MCNC: 8.4 MG/DL (ref 7.8–10.44)
CHLORIDE SERPL-SCNC: 113 MMOL/L (ref 98–107)
CO2 SERPL-SCNC: 19 MMOL/L (ref 22–29)
CREAT CL PREDICTED SERPL C-G-VRATE: 101 ML/MIN (ref 70–130)
EOSINOPHIL # BLD AUTO: 0.4 THOU/UL (ref 0–0.7)
EOSINOPHIL NFR BLD AUTO: 5.1 % (ref 0–10)
GLUCOSE SERPL-MCNC: 126 MG/DL (ref 70–105)
HGB BLD-MCNC: 9.1 G/DL (ref 12–16)
LYMPHOCYTES # BLD: 1.9 THOU/UL (ref 1.2–3.4)
LYMPHOCYTES NFR BLD AUTO: 25.3 % (ref 21–51)
MCH RBC QN AUTO: 28.7 PG (ref 27–31)
MCV RBC AUTO: 89.7 FL (ref 78–98)
MONOCYTES # BLD AUTO: 0.5 THOU/UL (ref 0.11–0.59)
MONOCYTES NFR BLD AUTO: 7.2 % (ref 0–10)
NEUTROPHILS # BLD AUTO: 4.6 THOU/UL (ref 1.4–6.5)
NEUTROPHILS NFR BLD AUTO: 61.7 % (ref 42–75)
PLATELET # BLD AUTO: 212 THOU/UL (ref 130–400)
POTASSIUM SERPL-SCNC: 4.1 MMOL/L (ref 3.5–5.1)
RBC # BLD AUTO: 3.17 MILL/UL (ref 4.2–5.4)
SODIUM SERPL-SCNC: 142 MMOL/L (ref 136–145)
WBC # BLD AUTO: 7.4 THOU/UL (ref 4.8–10.8)

## 2020-12-29 RX ADMIN — SACUBITRIL AND VALSARTAN SCH TAB: 49; 51 TABLET, FILM COATED ORAL at 08:57

## 2020-12-29 NOTE — PDOC.HOSPP
- Subjective


Encounter Date: 12/29/20


Encounter Time: 10:00


Subjective: 


Patient feeling better. Still feeling a bit unsteady, but if walks slowly she is

able to ambulate well in the hallways without walker or assistance. 





- Objective


Vital Signs & Weight: 


                             Vital Signs (12 hours)











  Temp Pulse Resp BP BP BP Pulse Ox


 


 12/29/20 03:00  97.6 F  82  18    111/64  97


 


 12/28/20 23:51  98.2 F  94  18    130/65  98


 


 12/28/20 19:56  97.9 F  81  18  106/54 L  104/53 L  94/46 L  99








                                     Weight











Weight                         213 lb 4 oz














Result Diagrams: 


                                 12/29/20 04:46





                                 12/29/20 04:46


Additional Labs: 


                                   Accuchecks











  12/29/20 12/28/20





  05:36 21:06


 


POC Glucose  128 H  111 H














Hospitalist ROS





- Review of Systems


Constitutional: denies: fever, chills, weakness


Respiratory: denies: cough, shortness of breath


Cardiovascular: denies: chest pain, palpitations


Gastrointestinal: denies: nausea, vomiting, abdominal pain





- Medication


Medications: 


Active Medications











Generic Name Dose Route Start Last Admin





  Trade Name Freq  PRN Reason Stop Dose Admin


 


Atorvastatin Calcium  40 mg  12/28/20 21:00  12/28/20 22:23





  Atorvastatin Calcium 40 Mg Tab  PO   40 mg





  HS JAGUAR   Administration


 


Famotidine  20 mg  12/28/20 21:00  12/28/20 22:23





  Famotidine 20 Mg Tab  PO   Not Given





  BID JAGUAR  


 


Isosorbide Dinitrate  20 mg  12/28/20 21:00  12/28/20 22:24





  Isosorbide Dinitrate 20 Mg Tab  PO   Not Given





  BID JAGUAR  


 


Sacubitril/Valsartan  2 tab  12/28/20 21:00  12/28/20 22:24





  Sacubitril 49 Mg/Valsartan 51 Mg Tablet  PO   Not Given





  BID JAGUAR  














- Exam


General Appearance: NAD, awake alert


ENT: moist mucosa


Heart: RRR, no murmur, no gallops, no rubs


Respiratory: CTAB, no wheezes, no rales, no ronchi


Gastrointestinal: soft, non-tender, non-distended, normal bowel sounds


Extremities: no edema


Psychiatric: normal affect, normal behavior, A&O x 3





Hosp A/P


(1) Orthostatic hypotension


Code(s): I95.1 - ORTHOSTATIC HYPOTENSION   Status: Acute   





(2) CHF (congestive heart failure)


Code(s): I50.9 - HEART FAILURE, UNSPECIFIED   Status: Chronic   


Qualifiers: 


   Heart failure type: systolic 





(3) HTN (hypertension)


Code(s): I10 - ESSENTIAL (PRIMARY) HYPERTENSION   Status: Chronic   


Qualifiers: 


   Hypertension type: essential hypertension   Qualified Code(s): I10 - 

Essential (primary) hypertension   





(4) DM type 2 (diabetes mellitus, type 2)


Status: Chronic   





- Plan





orthostatic hypotension resolved


patient ambulating well, can d/c home. Will hold diuretics for a couple days, 

then restart.


no convincing evidence of UTI at this time, will hold on further antibiotics 

unless culture grows significant bacteria back

## 2020-12-29 NOTE — DIS
DATE OF ADMISSION:  12/28/2020



DATE OF DISCHARGE:  12/29/2020



PRIMARY CARE PHYSICIAN:  Tabatha Osborn.



REASON FOR ADMISSION:  Dizziness and orthostatic hypotension.



DIAGNOSES AT DISCHARGE:  

1. Orthostatic hypotension, resolved.

2. Chronic and systolic congestive heart failure, not in exacerbation.

3. Chronic hypertension.

4. Diabetes mellitus type 2.



PROCEDURES:  None.



CONSULTATIONS:  None.



SUMMARY OF HOSPITAL COURSE:  This is a 55-year-old  female with a

history of nonischemic cardiomyopathy and diabetes.  She went into the [facility]

[facility]gency Room because she was feeling a little bit dizzy, worse when she would

stand up, though she was able to ambulate just fine.  She was found to have an

initial blood pressure of mildly hypotensive.  We have given some fluids there.  She

was noted to be orthostatic with some question of drop in her pulse even in spite of

her pacemaker and had a mildly elevated lactic acid resolved, so she was sent over

here for observation.  The patient was observed overnight in the hospital.  She had

no arrhythmias.  Her pacemaker interrogation was normal.  She had resolution of

lactic acidosis with fluids.  She was able to ambulate the day of discharge without

any dizziness or significant weakness and orthostatic hypotension that resolved on

recheck, so she is being discharged home. 



DISCHARGE MANAGEMENT:  Discharged home.



ACTIVITY:  As tolerated.



DIET:  Diabetic, healthy heart diet.



FOLLOWUP:  Follow up with primary care physician in 7 days.



DISCHARGE MEDICATIONS:  

1. Spironolactone/hydrochlorothiazide 25/25 mg one tab p.o. daily, hold this for the

next couple of days and then resume. 

2. Aspirin 325 mg daily.

3. Atorvastatin 40 mg at night.

4. Carvedilol 12.5 mg twice a day.

5. Farxiga 5 mg daily.

6. Ferrous sulfate 325 mg daily.

7. Metformin 500 mg twice a day.

8. Entresto 97/103 mg one tablet twice a day.







Job ID:  [id number]

## 2022-03-09 NOTE — RAD
AP VIEW CHEST:

 

Date:  10/22/18 

 

HISTORY:  

Status post pacemaker insertion. 

 

FINDINGS:

 

Comparison made to previous exam from 02/15/18. 

 

AP view of chest demonstrates mild cardiomegaly. There has been placement of an intracardiac defibril
lator. The lungs are well aerated. No evidence of acute intrathoracic abnormality seen. No evidence o
f effusions, pneumonia, or pneumothorax seen. 

 

IMPRESSION: 

Unremarkable AP view chest post pacemaker placement. 

 

POS: [facility] [place]
5

## 2022-04-13 ENCOUNTER — HOSPITAL ENCOUNTER (EMERGENCY)
Dept: HOSPITAL 18 - NAV ERS | Age: 57
Discharge: HOME | End: 2022-04-13
Payer: MEDICARE

## 2022-04-13 DIAGNOSIS — Z95.0: ICD-10-CM

## 2022-04-13 DIAGNOSIS — E11.9: ICD-10-CM

## 2022-04-13 DIAGNOSIS — Z79.82: ICD-10-CM

## 2022-04-13 DIAGNOSIS — Z79.4: ICD-10-CM

## 2022-04-13 DIAGNOSIS — I95.9: Primary | ICD-10-CM

## 2022-04-13 DIAGNOSIS — Z79.899: ICD-10-CM

## 2022-04-13 DIAGNOSIS — E78.5: ICD-10-CM

## 2022-04-13 DIAGNOSIS — I50.9: ICD-10-CM

## 2022-04-13 DIAGNOSIS — D50.9: ICD-10-CM

## 2022-04-13 DIAGNOSIS — I11.0: ICD-10-CM

## 2022-04-13 DIAGNOSIS — E86.0: ICD-10-CM

## 2022-04-13 LAB
ANION GAP SERPL CALC-SCNC: 13 MMOL/L (ref 10–20)
BASOPHILS # BLD AUTO: 0.1 THOU/UL (ref 0–0.2)
BASOPHILS NFR BLD AUTO: 1.2 % (ref 0–1)
BUN SERPL-MCNC: 24 MG/DL (ref 9.8–20.1)
CALCIUM SERPL-MCNC: 8.5 MG/DL (ref 7.8–10.44)
CHLORIDE SERPL-SCNC: 112 MMOL/L (ref 98–107)
CO2 SERPL-SCNC: 22 MMOL/L (ref 22–29)
CREAT CL PREDICTED SERPL C-G-VRATE: 0 ML/MIN (ref 70–130)
EOSINOPHIL # BLD AUTO: 0.4 THOU/UL (ref 0–0.7)
EOSINOPHIL NFR BLD AUTO: 6.3 % (ref 0–10)
GLUCOSE SERPL-MCNC: 219 MG/DL (ref 70–105)
HGB BLD-MCNC: 9.6 G/DL (ref 12–16)
LYMPHOCYTES # BLD AUTO: 1.5 THOU/UL (ref 1.2–3.4)
LYMPHOCYTES NFR BLD AUTO: 21.9 % (ref 21–51)
MCH RBC QN AUTO: 27.6 PG (ref 27–31)
MCV RBC AUTO: 95.2 FL (ref 78–98)
MONOCYTES # BLD AUTO: 0.5 THOU/UL (ref 0.11–0.59)
MONOCYTES NFR BLD AUTO: 7.6 % (ref 0–10)
NEUTROPHILS # BLD AUTO: 4.3 THOU/UL (ref 1.4–6.5)
NEUTROPHILS NFR BLD AUTO: 62.9 % (ref 42–75)
PLATELET # BLD AUTO: 187 THOU/UL (ref 130–400)
POTASSIUM SERPL-SCNC: 4.5 MMOL/L (ref 3.5–5.1)
RBC # BLD AUTO: 3.49 MILL/UL (ref 4.2–5.4)
SODIUM SERPL-SCNC: 142 MMOL/L (ref 136–145)
WBC # BLD AUTO: 6.8 THOU/UL (ref 4.8–10.8)

## 2022-04-13 PROCEDURE — 80048 BASIC METABOLIC PNL TOTAL CA: CPT

## 2022-04-13 PROCEDURE — 99284 EMERGENCY DEPT VISIT MOD MDM: CPT

## 2022-04-13 PROCEDURE — 85025 COMPLETE CBC W/AUTO DIFF WBC: CPT

## 2022-07-09 ENCOUNTER — HOSPITAL ENCOUNTER (EMERGENCY)
Dept: HOSPITAL 18 - NAV ERS | Age: 57
Discharge: HOME | End: 2022-07-09
Payer: MEDICARE

## 2022-07-09 DIAGNOSIS — Z79.899: ICD-10-CM

## 2022-07-09 DIAGNOSIS — E78.5: ICD-10-CM

## 2022-07-09 DIAGNOSIS — Z79.84: ICD-10-CM

## 2022-07-09 DIAGNOSIS — I11.0: ICD-10-CM

## 2022-07-09 DIAGNOSIS — E11.9: ICD-10-CM

## 2022-07-09 DIAGNOSIS — I50.9: ICD-10-CM

## 2022-07-09 DIAGNOSIS — U07.1: Primary | ICD-10-CM

## 2022-07-09 PROCEDURE — U0003 INFECTIOUS AGENT DETECTION BY NUCLEIC ACID (DNA OR RNA); SEVERE ACUTE RESPIRATORY SYNDROME CORONAVIRUS 2 (SARS-COV-2) (CORONAVIRUS DISEASE [COVID-19]), AMPLIFIED PROBE TECHNIQUE, MAKING USE OF HIGH THROUGHPUT TECHNOLOGIES AS DESCRIBED BY CMS-2020-01-R: HCPCS

## 2022-07-09 PROCEDURE — U0005 INFEC AGEN DETEC AMPLI PROBE: HCPCS

## 2022-07-09 PROCEDURE — 71046 X-RAY EXAM CHEST 2 VIEWS: CPT

## 2022-07-25 ENCOUNTER — HOSPITAL ENCOUNTER (EMERGENCY)
Dept: HOSPITAL 18 - NAV ERS | Age: 57
Discharge: HOME | End: 2022-07-25
Payer: MEDICARE

## 2022-07-25 DIAGNOSIS — Z79.84: ICD-10-CM

## 2022-07-25 DIAGNOSIS — E78.5: ICD-10-CM

## 2022-07-25 DIAGNOSIS — E11.65: Primary | ICD-10-CM

## 2022-07-25 DIAGNOSIS — I11.0: ICD-10-CM

## 2022-07-25 DIAGNOSIS — Z79.899: ICD-10-CM

## 2022-07-25 DIAGNOSIS — I50.9: ICD-10-CM

## 2022-07-25 PROCEDURE — 36416 COLLJ CAPILLARY BLOOD SPEC: CPT

## 2022-07-25 PROCEDURE — 99284 EMERGENCY DEPT VISIT MOD MDM: CPT

## 2022-08-03 ENCOUNTER — HOSPITAL ENCOUNTER (EMERGENCY)
Dept: HOSPITAL 18 - NAV ERS | Age: 57
Discharge: HOME | End: 2022-08-03
Payer: MEDICARE

## 2022-08-03 DIAGNOSIS — E11.9: ICD-10-CM

## 2022-08-03 DIAGNOSIS — E78.5: ICD-10-CM

## 2022-08-03 DIAGNOSIS — I10: Primary | ICD-10-CM

## 2022-08-03 DIAGNOSIS — I11.0: ICD-10-CM

## 2022-08-03 DIAGNOSIS — I50.9: ICD-10-CM

## 2022-08-03 DIAGNOSIS — Z79.899: ICD-10-CM

## 2022-08-03 DIAGNOSIS — Z79.82: ICD-10-CM

## 2022-08-03 DIAGNOSIS — D50.9: ICD-10-CM

## 2022-08-03 DIAGNOSIS — Z79.84: ICD-10-CM

## 2022-08-03 PROCEDURE — 99281 EMR DPT VST MAYX REQ PHY/QHP: CPT

## 2022-08-04 ENCOUNTER — HOSPITAL ENCOUNTER (EMERGENCY)
Dept: HOSPITAL 18 - NAV ERS | Age: 57
Discharge: HOME | End: 2022-08-04
Payer: COMMERCIAL

## 2022-08-04 DIAGNOSIS — E11.65: Primary | ICD-10-CM

## 2022-08-04 DIAGNOSIS — Z95.0: ICD-10-CM

## 2022-08-04 DIAGNOSIS — D50.9: ICD-10-CM

## 2022-08-04 DIAGNOSIS — I50.9: ICD-10-CM

## 2022-08-04 DIAGNOSIS — E11.9: ICD-10-CM

## 2022-08-04 DIAGNOSIS — Z79.84: ICD-10-CM

## 2022-08-04 DIAGNOSIS — E78.5: ICD-10-CM

## 2022-08-04 DIAGNOSIS — Z79.82: ICD-10-CM

## 2022-08-04 DIAGNOSIS — Z79.899: ICD-10-CM

## 2022-08-04 DIAGNOSIS — I11.0: ICD-10-CM

## 2022-08-04 PROCEDURE — 99281 EMR DPT VST MAYX REQ PHY/QHP: CPT

## 2022-08-04 PROCEDURE — 36416 COLLJ CAPILLARY BLOOD SPEC: CPT

## 2022-11-18 ENCOUNTER — HOSPITAL ENCOUNTER (EMERGENCY)
Dept: HOSPITAL 18 - NAV ERS | Age: 57
Discharge: HOME | End: 2022-11-18
Payer: COMMERCIAL

## 2022-11-18 DIAGNOSIS — Z79.899: ICD-10-CM

## 2022-11-18 DIAGNOSIS — I50.9: ICD-10-CM

## 2022-11-18 DIAGNOSIS — Z79.82: ICD-10-CM

## 2022-11-18 DIAGNOSIS — E11.9: ICD-10-CM

## 2022-11-18 DIAGNOSIS — I11.0: ICD-10-CM

## 2022-11-18 DIAGNOSIS — J32.9: ICD-10-CM

## 2022-11-18 DIAGNOSIS — J10.1: Primary | ICD-10-CM

## 2022-11-18 DIAGNOSIS — E78.5: ICD-10-CM

## 2022-11-18 DIAGNOSIS — Z79.84: ICD-10-CM

## 2022-11-18 PROCEDURE — 87804 INFLUENZA ASSAY W/OPTIC: CPT

## 2022-11-18 PROCEDURE — 99283 EMERGENCY DEPT VISIT LOW MDM: CPT

## 2023-03-10 ENCOUNTER — HOSPITAL ENCOUNTER (EMERGENCY)
Dept: HOSPITAL 18 - NAV ERS | Age: 58
Discharge: HOME | End: 2023-03-10
Payer: COMMERCIAL

## 2023-03-10 DIAGNOSIS — Z79.84: ICD-10-CM

## 2023-03-10 DIAGNOSIS — E78.5: ICD-10-CM

## 2023-03-10 DIAGNOSIS — I10: ICD-10-CM

## 2023-03-10 DIAGNOSIS — Z79.899: ICD-10-CM

## 2023-03-10 DIAGNOSIS — E11.9: ICD-10-CM

## 2023-03-10 DIAGNOSIS — I95.9: Primary | ICD-10-CM

## 2023-03-10 PROCEDURE — 99284 EMERGENCY DEPT VISIT MOD MDM: CPT

## 2023-03-20 ENCOUNTER — HOSPITAL ENCOUNTER (EMERGENCY)
Dept: HOSPITAL 18 - NAV ERS | Age: 58
Discharge: HOME | End: 2023-03-20
Payer: COMMERCIAL

## 2023-03-20 DIAGNOSIS — Z79.84: ICD-10-CM

## 2023-03-20 DIAGNOSIS — Z79.82: ICD-10-CM

## 2023-03-20 DIAGNOSIS — I95.9: Primary | ICD-10-CM

## 2023-03-20 DIAGNOSIS — E78.5: ICD-10-CM

## 2023-03-20 DIAGNOSIS — E11.9: ICD-10-CM

## 2023-03-20 DIAGNOSIS — I50.9: ICD-10-CM

## 2023-03-20 DIAGNOSIS — I11.0: ICD-10-CM

## 2023-03-20 LAB
ALBUMIN SERPL BCG-MCNC: 3.6 G/DL (ref 3.5–5)
ALP SERPL-CCNC: 79 U/L (ref 40–110)
ALT SERPL W P-5'-P-CCNC: 10 U/L (ref 8–55)
ANION GAP SERPL CALC-SCNC: 15 MMOL/L (ref 10–20)
AST SERPL-CCNC: 10 U/L (ref 5–34)
BASOPHILS # BLD AUTO: 0.1 THOU/UL (ref 0–0.2)
BASOPHILS NFR BLD AUTO: 1 % (ref 0–1)
BILIRUB SERPL-MCNC: 0.3 MG/DL (ref 0.2–1.2)
BUN SERPL-MCNC: 21 MG/DL (ref 9.8–20.1)
CALCIUM SERPL-MCNC: 8.7 MG/DL (ref 7.8–10.44)
CHLORIDE SERPL-SCNC: 109 MMOL/L (ref 98–107)
CO2 SERPL-SCNC: 21 MMOL/L (ref 22–29)
CREAT CL PREDICTED SERPL C-G-VRATE: 0 ML/MIN (ref 70–130)
EOSINOPHIL # BLD AUTO: 0.6 THOU/UL (ref 0–0.7)
EOSINOPHIL NFR BLD AUTO: 8.4 % (ref 0–10)
GLOBULIN SER CALC-MCNC: 3.7 G/DL (ref 2.4–3.5)
GLUCOSE SERPL-MCNC: 111 MG/DL (ref 70–105)
HGB BLD-MCNC: 10 G/DL (ref 12–16)
LYMPHOCYTES # BLD AUTO: 1.8 THOU/UL (ref 1.2–3.4)
LYMPHOCYTES NFR BLD AUTO: 26.1 % (ref 21–51)
MAGNESIUM SERPL-MCNC: 1.9 MG/DL (ref 1.6–2.6)
MCH RBC QN AUTO: 27.9 PG (ref 27–31)
MCV RBC AUTO: 91.3 FL (ref 78–98)
MONOCYTES # BLD AUTO: 0.5 THOU/UL (ref 0.11–0.59)
MONOCYTES NFR BLD AUTO: 7.7 % (ref 0–10)
NEUTROPHILS # BLD AUTO: 3.8 THOU/UL (ref 1.4–6.5)
NEUTROPHILS NFR BLD AUTO: 56.8 % (ref 42–75)
PLATELET # BLD AUTO: 215 10X3/UL (ref 130–400)
POTASSIUM SERPL-SCNC: 3.9 MMOL/L (ref 3.5–5.1)
RBC # BLD AUTO: 3.56 MILL/UL (ref 4.2–5.4)
SODIUM SERPL-SCNC: 141 MMOL/L (ref 136–145)
WBC # BLD AUTO: 6.8 10X3/UL (ref 4.8–10.8)

## 2023-03-20 PROCEDURE — 85025 COMPLETE CBC W/AUTO DIFF WBC: CPT

## 2023-03-20 PROCEDURE — 80053 COMPREHEN METABOLIC PANEL: CPT

## 2023-03-20 PROCEDURE — 99284 EMERGENCY DEPT VISIT MOD MDM: CPT

## 2023-03-20 PROCEDURE — 83735 ASSAY OF MAGNESIUM: CPT

## 2023-03-20 PROCEDURE — 36415 COLL VENOUS BLD VENIPUNCTURE: CPT

## 2023-04-18 ENCOUNTER — HOSPITAL ENCOUNTER (EMERGENCY)
Dept: HOSPITAL 18 - NAV ERS | Age: 58
Discharge: HOME | End: 2023-04-18
Payer: COMMERCIAL

## 2023-04-18 DIAGNOSIS — T46.1X5A: ICD-10-CM

## 2023-04-18 DIAGNOSIS — E11.9: ICD-10-CM

## 2023-04-18 DIAGNOSIS — Z79.84: ICD-10-CM

## 2023-04-18 DIAGNOSIS — Z79.82: ICD-10-CM

## 2023-04-18 DIAGNOSIS — I95.9: Primary | ICD-10-CM

## 2023-04-18 DIAGNOSIS — I11.0: ICD-10-CM

## 2023-04-18 DIAGNOSIS — I50.9: ICD-10-CM

## 2023-04-18 PROCEDURE — 99284 EMERGENCY DEPT VISIT MOD MDM: CPT

## 2023-07-18 ENCOUNTER — HOSPITAL ENCOUNTER (EMERGENCY)
Dept: HOSPITAL 18 - NAV ERS | Age: 58
Discharge: HOME | End: 2023-07-18
Payer: COMMERCIAL

## 2023-07-18 DIAGNOSIS — E11.9: Primary | ICD-10-CM

## 2023-07-18 DIAGNOSIS — Z79.82: ICD-10-CM

## 2023-07-18 DIAGNOSIS — E78.5: ICD-10-CM

## 2023-07-18 DIAGNOSIS — Z79.84: ICD-10-CM

## 2023-07-18 DIAGNOSIS — I10: ICD-10-CM

## 2023-07-18 PROCEDURE — 36416 COLLJ CAPILLARY BLOOD SPEC: CPT

## 2023-07-18 PROCEDURE — 99283 EMERGENCY DEPT VISIT LOW MDM: CPT

## 2024-02-18 ENCOUNTER — HOSPITAL ENCOUNTER (EMERGENCY)
Dept: HOSPITAL 18 - NAV ERS | Age: 59
Discharge: HOME | End: 2024-02-18
Payer: COMMERCIAL

## 2024-02-18 DIAGNOSIS — Z79.899: ICD-10-CM

## 2024-02-18 DIAGNOSIS — R09.89: ICD-10-CM

## 2024-02-18 DIAGNOSIS — R09.81: Primary | ICD-10-CM

## 2024-02-18 DIAGNOSIS — R03.0: ICD-10-CM

## 2024-02-18 DIAGNOSIS — I11.0: ICD-10-CM

## 2024-02-18 DIAGNOSIS — I50.9: ICD-10-CM

## 2024-02-18 DIAGNOSIS — E11.9: ICD-10-CM

## 2024-02-18 DIAGNOSIS — Z79.84: ICD-10-CM

## 2024-02-18 DIAGNOSIS — Z79.82: ICD-10-CM

## 2024-02-18 DIAGNOSIS — E78.5: ICD-10-CM

## 2024-02-18 PROCEDURE — 87804 INFLUENZA ASSAY W/OPTIC: CPT

## 2024-02-18 PROCEDURE — 87635 SARS-COV-2 COVID-19 AMP PRB: CPT

## 2024-02-18 PROCEDURE — 71046 X-RAY EXAM CHEST 2 VIEWS: CPT

## 2024-06-14 ENCOUNTER — HOSPITAL ENCOUNTER (EMERGENCY)
Dept: HOSPITAL 18 - NAV ERS | Age: 59
Discharge: HOME | End: 2024-06-14
Payer: COMMERCIAL

## 2024-06-14 DIAGNOSIS — E78.5: ICD-10-CM

## 2024-06-14 DIAGNOSIS — I11.0: Primary | ICD-10-CM

## 2024-06-14 DIAGNOSIS — Z79.84: ICD-10-CM

## 2024-06-14 DIAGNOSIS — Z79.899: ICD-10-CM

## 2024-06-14 DIAGNOSIS — Z79.82: ICD-10-CM

## 2024-06-14 DIAGNOSIS — I50.9: ICD-10-CM

## 2024-06-14 DIAGNOSIS — E11.9: ICD-10-CM

## 2024-06-14 PROCEDURE — 99283 EMERGENCY DEPT VISIT LOW MDM: CPT

## 2024-07-10 ENCOUNTER — HOSPITAL ENCOUNTER (EMERGENCY)
Dept: HOSPITAL 18 - NAV ERS | Age: 59
Discharge: HOME | End: 2024-07-10
Payer: COMMERCIAL

## 2024-07-10 DIAGNOSIS — Z79.899: ICD-10-CM

## 2024-07-10 DIAGNOSIS — E11.9: ICD-10-CM

## 2024-07-10 DIAGNOSIS — I11.0: ICD-10-CM

## 2024-07-10 DIAGNOSIS — T63.461A: Primary | ICD-10-CM

## 2024-07-10 DIAGNOSIS — E78.5: ICD-10-CM

## 2024-07-10 DIAGNOSIS — I50.9: ICD-10-CM

## 2024-07-10 DIAGNOSIS — T78.40XA: ICD-10-CM

## 2024-07-10 DIAGNOSIS — Z79.82: ICD-10-CM

## 2024-07-10 DIAGNOSIS — Z79.84: ICD-10-CM

## 2024-07-10 PROCEDURE — 99282 EMERGENCY DEPT VISIT SF MDM: CPT

## 2024-12-27 ENCOUNTER — HOSPITAL ENCOUNTER (EMERGENCY)
Dept: HOSPITAL 18 - NAV ERS | Age: 59
Discharge: HOME | End: 2024-12-27
Payer: COMMERCIAL

## 2024-12-27 DIAGNOSIS — I50.9: ICD-10-CM

## 2024-12-27 DIAGNOSIS — I95.1: Primary | ICD-10-CM

## 2024-12-27 DIAGNOSIS — E78.5: ICD-10-CM

## 2024-12-27 DIAGNOSIS — E11.9: ICD-10-CM

## 2024-12-27 DIAGNOSIS — Z79.84: ICD-10-CM

## 2024-12-27 DIAGNOSIS — Z79.899: ICD-10-CM

## 2024-12-27 DIAGNOSIS — E86.0: ICD-10-CM

## 2024-12-27 DIAGNOSIS — Z79.82: ICD-10-CM

## 2024-12-27 DIAGNOSIS — I11.0: ICD-10-CM

## 2024-12-27 DIAGNOSIS — Z95.0: ICD-10-CM

## 2024-12-27 LAB
ALBUMIN SERPL BCG-MCNC: 3.3 G/DL (ref 3.5–5)
ALP SERPL-CCNC: 80 U/L (ref 40–110)
ALT SERPL W P-5'-P-CCNC: 11 U/L (ref 8–55)
ANION GAP SERPL CALC-SCNC: 15 MMOL/L (ref 10–20)
AST SERPL-CCNC: 10 U/L (ref 5–34)
BACTERIA UR QL AUTO: (no result) HPF
BASOPHILS # BLD AUTO: 0.1 THOU/UL (ref 0–0.2)
BASOPHILS NFR BLD AUTO: 1.2 % (ref 0–1)
BILIRUB SERPL-MCNC: 0.3 MG/DL (ref 0.2–1.2)
BUN SERPL-MCNC: 22 MG/DL (ref 9.8–20.1)
CALCIUM SERPL-MCNC: 8.5 MG/DL (ref 7.8–10.44)
CAUTI INDICATIONS FOR CULTURE: (no result)
CHLORIDE SERPL-SCNC: 107 MMOL/L (ref 98–107)
CO2 SERPL-SCNC: 21 MMOL/L (ref 22–29)
CREAT CL PREDICTED SERPL C-G-VRATE: 0 ML/MIN (ref 70–130)
EOSINOPHIL # BLD AUTO: 0.5 THOU/UL (ref 0–0.7)
EOSINOPHIL NFR BLD AUTO: 10.1 % (ref 0–10)
GLOBULIN SER CALC-MCNC: 4.2 G/DL (ref 2.4–3.5)
GLUCOSE SERPL-MCNC: 217 MG/DL (ref 70–105)
HCT VFR BLD CALC: 33.4 % (ref 36–47)
HGB BLD-MCNC: 9.9 G/DL (ref 12–16)
LYMPHOCYTES # BLD AUTO: 1.1 THOU/UL (ref 1.2–3.4)
LYMPHOCYTES NFR BLD AUTO: 25.1 % (ref 21–51)
MANUAL DIFF??: NO
MCH RBC QN AUTO: 26 PG (ref 27–31)
MCV RBC AUTO: 87.7 FL (ref 78–98)
MONOCYTES # BLD AUTO: 0.3 THOU/UL (ref 0.11–0.59)
MONOCYTES NFR BLD AUTO: 6.8 % (ref 0–10)
NEUTROPHILS # BLD AUTO: 2.6 THOU/UL (ref 1.4–6.5)
NEUTROPHILS NFR BLD AUTO: 56.8 % (ref 42–75)
PLATELET # BLD AUTO: 238 10X3/UL (ref 130–400)
POTASSIUM SERPL-SCNC: 4.2 MMOL/L (ref 3.5–5.1)
PROT UR STRIP.AUTO-MCNC: 100 MG/DL
RBC # BLD AUTO: 3.81 MILL/UL (ref 4.2–5.4)
RBC UR QL AUTO: (no result) HPF (ref 0–3)
SODIUM SERPL-SCNC: 139 MMOL/L (ref 136–145)
SP GR UR STRIP: 1.02 (ref 1–1.03)
TROPONIN I SERPL DL<=0.01 NG/ML-MCNC: (no result) NG/ML (ref ?–0.03)
WBC # BLD AUTO: 4.6 10X3/UL (ref 4.8–10.8)
WBC UR QL AUTO: (no result) HPF (ref 0–3)
YEAST # UR AUTO: (no result) HPF

## 2024-12-27 PROCEDURE — 71045 X-RAY EXAM CHEST 1 VIEW: CPT

## 2024-12-27 PROCEDURE — 81001 URINALYSIS AUTO W/SCOPE: CPT

## 2024-12-27 PROCEDURE — 84484 ASSAY OF TROPONIN QUANT: CPT

## 2024-12-27 PROCEDURE — 85025 COMPLETE CBC W/AUTO DIFF WBC: CPT

## 2024-12-27 PROCEDURE — 80053 COMPREHEN METABOLIC PANEL: CPT

## 2024-12-27 PROCEDURE — 93005 ELECTROCARDIOGRAM TRACING: CPT

## 2024-12-27 PROCEDURE — 83605 ASSAY OF LACTIC ACID: CPT

## 2025-01-24 ENCOUNTER — HOSPITAL ENCOUNTER (EMERGENCY)
Dept: HOSPITAL 18 - NAV ERS | Age: 60
Discharge: HOME | End: 2025-01-24
Payer: COMMERCIAL

## 2025-01-24 DIAGNOSIS — I11.0: ICD-10-CM

## 2025-01-24 DIAGNOSIS — Z79.84: ICD-10-CM

## 2025-01-24 DIAGNOSIS — E11.9: ICD-10-CM

## 2025-01-24 DIAGNOSIS — Z79.899: ICD-10-CM

## 2025-01-24 DIAGNOSIS — I95.89: Primary | ICD-10-CM

## 2025-01-24 DIAGNOSIS — I50.9: ICD-10-CM

## 2025-01-24 DIAGNOSIS — E78.5: ICD-10-CM

## 2025-01-24 DIAGNOSIS — D64.9: ICD-10-CM

## 2025-01-24 LAB
ALBUMIN SERPL BCG-MCNC: 3.3 G/DL (ref 3.1–4.5)
ALP SERPL-CCNC: 81 U/L (ref 40–110)
ALT SERPL W P-5'-P-CCNC: (no result) U/L (ref ?–34)
ANION GAP SERPL CALC-SCNC: 13 MMOL/L (ref 10–20)
AST SERPL-CCNC: 9 U/L (ref 11–34)
BASOPHILS # BLD AUTO: 0.1 THOU/UL (ref 0–0.2)
BASOPHILS NFR BLD AUTO: 1.4 % (ref 0–1)
BILIRUB SERPL-MCNC: 0.3 MG/DL (ref 0.3–1.2)
BUN SERPL-MCNC: 23 MG/DL (ref 9.8–20.1)
CALCIUM SERPL-MCNC: 9.2 MG/DL (ref 7.8–10.44)
CHLORIDE SERPL-SCNC: 108 MMOL/L (ref 98–107)
CO2 SERPL-SCNC: 22 MMOL/L (ref 22–29)
CREAT CL PREDICTED SERPL C-G-VRATE: 0 ML/MIN (ref 70–130)
EOSINOPHIL # BLD AUTO: 0.4 THOU/UL (ref 0–0.7)
EOSINOPHIL NFR BLD AUTO: 7.1 % (ref 0–10)
GLOBULIN SER CALC-MCNC: 4.1 G/DL (ref 2.4–3.5)
GLUCOSE SERPL-MCNC: 206 MG/DL (ref 70–105)
HCT VFR BLD CALC: 32.7 % (ref 36–47)
HGB BLD-MCNC: 9.8 G/DL (ref 12–16)
LYMPHOCYTES # BLD AUTO: 1.3 THOU/UL (ref 1.2–3.4)
LYMPHOCYTES NFR BLD AUTO: 22.7 % (ref 21–51)
MCH RBC QN AUTO: 25.7 PG (ref 27–31)
MCV RBC AUTO: 85.7 FL (ref 78–98)
MONOCYTES # BLD AUTO: 0.5 THOU/UL (ref 0.11–0.59)
MONOCYTES NFR BLD AUTO: 8.3 % (ref 0–10)
NEUTROPHILS # BLD AUTO: 3.4 THOU/UL (ref 1.4–6.5)
NEUTROPHILS NFR BLD AUTO: 60.5 % (ref 42–75)
PLATELET # BLD AUTO: 245 10X3/UL (ref 130–400)
POTASSIUM SERPL-SCNC: 4.5 MMOL/L (ref 3.5–5.1)
RBC # BLD AUTO: 3.82 MILL/UL (ref 4.2–5.4)
SODIUM SERPL-SCNC: 138 MMOL/L (ref 136–145)
WBC # BLD AUTO: 5.6 10X3/UL (ref 4.8–10.8)

## 2025-01-24 PROCEDURE — 96360 HYDRATION IV INFUSION INIT: CPT

## 2025-01-24 PROCEDURE — 80053 COMPREHEN METABOLIC PANEL: CPT

## 2025-01-24 PROCEDURE — 85025 COMPLETE CBC W/AUTO DIFF WBC: CPT

## 2025-02-08 ENCOUNTER — HOSPITAL ENCOUNTER (EMERGENCY)
Dept: HOSPITAL 18 - NAV ERS | Age: 60
Discharge: HOME | End: 2025-02-08
Payer: COMMERCIAL

## 2025-02-08 DIAGNOSIS — I50.9: ICD-10-CM

## 2025-02-08 DIAGNOSIS — I11.0: ICD-10-CM

## 2025-02-08 DIAGNOSIS — Z79.899: ICD-10-CM

## 2025-02-08 DIAGNOSIS — Z79.82: ICD-10-CM

## 2025-02-08 DIAGNOSIS — E78.5: ICD-10-CM

## 2025-02-08 DIAGNOSIS — R05.9: Primary | ICD-10-CM

## 2025-02-08 DIAGNOSIS — E11.9: ICD-10-CM

## 2025-02-08 DIAGNOSIS — Z79.84: ICD-10-CM

## 2025-02-08 PROCEDURE — 71046 X-RAY EXAM CHEST 2 VIEWS: CPT

## 2025-03-01 ENCOUNTER — HOSPITAL ENCOUNTER (EMERGENCY)
Dept: HOSPITAL 18 - NAV ERS | Age: 60
Discharge: HOME | End: 2025-03-01
Payer: COMMERCIAL

## 2025-03-01 DIAGNOSIS — E86.0: Primary | ICD-10-CM

## 2025-03-01 DIAGNOSIS — E11.9: ICD-10-CM

## 2025-03-01 DIAGNOSIS — I50.9: ICD-10-CM

## 2025-03-01 DIAGNOSIS — D50.9: ICD-10-CM

## 2025-03-01 DIAGNOSIS — I11.0: ICD-10-CM

## 2025-03-01 DIAGNOSIS — Z95.0: ICD-10-CM

## 2025-03-01 LAB
ALBUMIN SERPL BCG-MCNC: 3.6 G/DL (ref 3.1–4.5)
ALP SERPL-CCNC: 78 U/L (ref 40–110)
ALT SERPL W P-5'-P-CCNC: (no result) U/L (ref ?–34)
ANION GAP SERPL CALC-SCNC: 13 MMOL/L (ref 10–20)
AST SERPL-CCNC: 12 U/L (ref 11–34)
BASOPHILS # BLD AUTO: 0.1 THOU/UL (ref 0–0.2)
BASOPHILS NFR BLD AUTO: 0.9 % (ref 0–1)
BILIRUB SERPL-MCNC: 0.3 MG/DL (ref 0.3–1.2)
BUN SERPL-MCNC: 33 MG/DL (ref 9.8–20.1)
CALCIUM SERPL-MCNC: 8.9 MG/DL (ref 7.8–10.44)
CHLORIDE SERPL-SCNC: 113 MMOL/L (ref 98–107)
CO2 SERPL-SCNC: 18 MMOL/L (ref 22–29)
CREAT CL PREDICTED SERPL C-G-VRATE: 0 ML/MIN (ref 70–130)
EOSINOPHIL # BLD AUTO: 0.3 THOU/UL (ref 0–0.7)
EOSINOPHIL NFR BLD AUTO: 4.8 % (ref 0–10)
GLOBULIN SER CALC-MCNC: 4.1 G/DL (ref 2.4–3.5)
GLUCOSE SERPL-MCNC: 96 MG/DL (ref 70–105)
HCT VFR BLD CALC: 31 % (ref 36–47)
HGB BLD-MCNC: 9.4 G/DL (ref 12–16)
LYMPHOCYTES # BLD AUTO: 1.3 THOU/UL (ref 1.2–3.4)
LYMPHOCYTES NFR BLD AUTO: 21.8 % (ref 21–51)
MCH RBC QN AUTO: 26 PG (ref 27–31)
MCV RBC AUTO: 85.4 FL (ref 78–98)
MONOCYTES # BLD AUTO: 0.5 THOU/UL (ref 0.11–0.59)
MONOCYTES NFR BLD AUTO: 8.1 % (ref 0–10)
NEUTROPHILS # BLD AUTO: 3.7 THOU/UL (ref 1.4–6.5)
NEUTROPHILS NFR BLD AUTO: 64.4 % (ref 42–75)
PLATELET # BLD AUTO: 214 10X3/UL (ref 130–400)
POTASSIUM SERPL-SCNC: 4.2 MMOL/L (ref 3.5–5.1)
RBC # BLD AUTO: 3.63 MILL/UL (ref 4.2–5.4)
SODIUM SERPL-SCNC: 140 MMOL/L (ref 136–145)
TROPONIN I SERPL DL<=0.01 NG/ML-MCNC: (no result) NG/ML (ref ?–0.03)
WBC # BLD AUTO: 5.8 10X3/UL (ref 4.8–10.8)

## 2025-03-01 PROCEDURE — 80053 COMPREHEN METABOLIC PANEL: CPT

## 2025-03-01 PROCEDURE — 93005 ELECTROCARDIOGRAM TRACING: CPT

## 2025-03-01 PROCEDURE — 99285 EMERGENCY DEPT VISIT HI MDM: CPT

## 2025-03-01 PROCEDURE — 84484 ASSAY OF TROPONIN QUANT: CPT

## 2025-03-01 PROCEDURE — 85025 COMPLETE CBC W/AUTO DIFF WBC: CPT

## 2025-03-01 PROCEDURE — 36415 COLL VENOUS BLD VENIPUNCTURE: CPT

## 2025-03-07 ENCOUNTER — HOSPITAL ENCOUNTER (EMERGENCY)
Dept: HOSPITAL 18 - NAV ERS | Age: 60
Discharge: HOME | End: 2025-03-07
Payer: COMMERCIAL

## 2025-03-07 DIAGNOSIS — E86.0: ICD-10-CM

## 2025-03-07 DIAGNOSIS — I50.9: ICD-10-CM

## 2025-03-07 DIAGNOSIS — E78.5: ICD-10-CM

## 2025-03-07 DIAGNOSIS — I11.0: ICD-10-CM

## 2025-03-07 DIAGNOSIS — E11.9: ICD-10-CM

## 2025-03-07 DIAGNOSIS — B34.9: Primary | ICD-10-CM

## 2025-03-07 LAB
ALBUMIN SERPL BCG-MCNC: 3.8 G/DL (ref 3.1–4.5)
ALP SERPL-CCNC: 71 U/L (ref 40–110)
ALT SERPL W P-5'-P-CCNC: 7 U/L (ref ?–34)
ANION GAP SERPL CALC-SCNC: 14 MMOL/L (ref 10–20)
AST SERPL-CCNC: 21 U/L (ref 11–34)
BASOPHILS # BLD AUTO: 0.1 THOU/UL (ref 0–0.2)
BASOPHILS NFR BLD AUTO: 0.7 % (ref 0–1)
BILIRUB SERPL-MCNC: 0.4 MG/DL (ref 0.3–1.2)
BUN SERPL-MCNC: 21 MG/DL (ref 9.8–20.1)
CA-I BLDV-SCNC: 1.23 MMOL/L (ref 1.15–1.33)
CALCIUM SERPL-MCNC: 9.5 MG/DL (ref 7.8–10.44)
CHLORIDE BLDV-SCNC: 116 MMOL/L (ref 98–107)
CHLORIDE SERPL-SCNC: 115 MMOL/L (ref 98–107)
CO2 BLDV CALC-SCNC: 22.7 MMOL/L (ref 22–28)
CO2 SERPL-SCNC: 18 MMOL/L (ref 22–29)
CREAT CL PREDICTED SERPL C-G-VRATE: 0 ML/MIN (ref 70–130)
EOSINOPHIL # BLD AUTO: 0.1 THOU/UL (ref 0–0.7)
EOSINOPHIL NFR BLD AUTO: 1.4 % (ref 0–10)
GLOBULIN SER CALC-MCNC: 4.3 G/DL (ref 2.4–3.5)
GLUCOSE SERPL-MCNC: 124 MG/DL (ref 70–105)
HCO3 BLDV-SCNC: 21.6 MMOL/L (ref 22–28)
HCT VFR BLD CALC: 33.3 % (ref 36–47)
HGB BLD CALC-MCNC: 11.7 G/DL (ref 12–16)
HGB BLD-MCNC: 10.7 G/DL (ref 12–16)
LYMPHOCYTES # BLD AUTO: 0.5 THOU/UL (ref 1.2–3.4)
LYMPHOCYTES NFR BLD AUTO: 4.6 % (ref 21–51)
MCH RBC QN AUTO: 27.5 PG (ref 27–31)
MCV RBC AUTO: 85.6 FL (ref 78–98)
MONOCYTES # BLD AUTO: 0.8 THOU/UL (ref 0.11–0.59)
MONOCYTES NFR BLD AUTO: 8.1 % (ref 0–10)
NEUTROPHILS # BLD AUTO: 8.6 THOU/UL (ref 1.4–6.5)
NEUTROPHILS NFR BLD AUTO: 85.1 % (ref 42–75)
PCO2 BLDV: 36.3 MMHG (ref 42–51)
PLATELET # BLD AUTO: 200 10X3/UL (ref 130–400)
POTASSIUM BLDV-SCNC: 4.2 MMOL/L (ref 3.5–5.1)
POTASSIUM SERPL-SCNC: 4.3 MMOL/L (ref 3.5–5.1)
RBC # BLD AUTO: 3.89 MILL/UL (ref 4.2–5.4)
SAO2 % BLDV FROM PO2: 65.2 % (ref 60–85)
SODIUM BLDV-SCNC: 147 MMOL/L (ref 138–145)
SODIUM SERPL-SCNC: 143 MMOL/L (ref 136–145)
TROPONIN I SERPL DL<=0.01 NG/ML-MCNC: (no result) NG/ML (ref ?–0.03)
WBC # BLD AUTO: 10.1 10X3/UL (ref 4.8–10.8)

## 2025-03-07 PROCEDURE — 83880 ASSAY OF NATRIURETIC PEPTIDE: CPT

## 2025-03-07 PROCEDURE — 87428 SARSCOV & INF VIR A&B AG IA: CPT

## 2025-03-07 PROCEDURE — 93005 ELECTROCARDIOGRAM TRACING: CPT

## 2025-03-07 PROCEDURE — 85025 COMPLETE CBC W/AUTO DIFF WBC: CPT

## 2025-03-07 PROCEDURE — 71046 X-RAY EXAM CHEST 2 VIEWS: CPT

## 2025-03-07 PROCEDURE — 96361 HYDRATE IV INFUSION ADD-ON: CPT

## 2025-03-07 PROCEDURE — 82435 ASSAY OF BLOOD CHLORIDE: CPT

## 2025-03-07 PROCEDURE — 82330 ASSAY OF CALCIUM: CPT

## 2025-03-07 PROCEDURE — 84132 ASSAY OF SERUM POTASSIUM: CPT

## 2025-03-07 PROCEDURE — 84484 ASSAY OF TROPONIN QUANT: CPT

## 2025-03-07 PROCEDURE — 85014 HEMATOCRIT: CPT

## 2025-03-07 PROCEDURE — 82803 BLOOD GASES ANY COMBINATION: CPT

## 2025-03-07 PROCEDURE — 85379 FIBRIN DEGRADATION QUANT: CPT

## 2025-03-07 PROCEDURE — 80053 COMPREHEN METABOLIC PANEL: CPT

## 2025-03-07 PROCEDURE — 84295 ASSAY OF SERUM SODIUM: CPT

## 2025-03-07 PROCEDURE — 71275 CT ANGIOGRAPHY CHEST: CPT

## 2025-03-07 PROCEDURE — 96360 HYDRATION IV INFUSION INIT: CPT

## 2025-03-07 PROCEDURE — 83605 ASSAY OF LACTIC ACID: CPT
